# Patient Record
Sex: MALE | Race: BLACK OR AFRICAN AMERICAN | NOT HISPANIC OR LATINO | Employment: FULL TIME | URBAN - METROPOLITAN AREA
[De-identification: names, ages, dates, MRNs, and addresses within clinical notes are randomized per-mention and may not be internally consistent; named-entity substitution may affect disease eponyms.]

---

## 2017-10-17 ENCOUNTER — APPOINTMENT (OUTPATIENT)
Dept: CT IMAGING | Facility: CLINIC | Age: 32
End: 2017-10-17
Attending: EMERGENCY MEDICINE

## 2017-10-17 ENCOUNTER — HOSPITAL ENCOUNTER (EMERGENCY)
Facility: CLINIC | Age: 32
Discharge: HOME | End: 2017-10-17
Attending: EMERGENCY MEDICINE | Admitting: EMERGENCY MEDICINE
Payer: COMMERCIAL

## 2017-10-17 ENCOUNTER — APPOINTMENT (OUTPATIENT)
Dept: GENERAL RADIOLOGY | Facility: CLINIC | Age: 32
End: 2017-10-17
Attending: EMERGENCY MEDICINE

## 2017-10-17 VITALS
BODY MASS INDEX: 19.62 KG/M2 | HEART RATE: 62 BPM | RESPIRATION RATE: 14 BRPM | HEIGHT: 67 IN | OXYGEN SATURATION: 98 % | TEMPERATURE: 97.3 F | WEIGHT: 125 LBS | DIASTOLIC BLOOD PRESSURE: 66 MMHG | SYSTOLIC BLOOD PRESSURE: 108 MMHG
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
## 2017-10-17 DIAGNOSIS — M54.2 NECK PAIN: ICD-10-CM

## 2017-10-17 DIAGNOSIS — M54.6 ACUTE MIDLINE THORACIC BACK PAIN: ICD-10-CM

## 2017-10-17 LAB
RADIOLOGIST FLAGS: ABNORMAL
RADIOLOGIST FLAGS: ABNORMAL

## 2017-10-17 PROCEDURE — 72128 CT CHEST SPINE W/O DYE: CPT

## 2017-10-17 PROCEDURE — 25000132 ZZH RX MED GY IP 250 OP 250 PS 637: Performed by: EMERGENCY MEDICINE

## 2017-10-17 PROCEDURE — 99284 EMERGENCY DEPT VISIT MOD MDM: CPT | Mod: 25 | Performed by: EMERGENCY MEDICINE

## 2017-10-17 PROCEDURE — 72040 X-RAY EXAM NECK SPINE 2-3 VW: CPT

## 2017-10-17 PROCEDURE — 72125 CT NECK SPINE W/O DYE: CPT

## 2017-10-17 PROCEDURE — 71020 XR CHEST 2 VW: CPT

## 2017-10-17 PROCEDURE — 72072 X-RAY EXAM THORAC SPINE 3VWS: CPT

## 2017-10-17 PROCEDURE — 99284 EMERGENCY DEPT VISIT MOD MDM: CPT | Mod: Z6 | Performed by: EMERGENCY MEDICINE

## 2017-10-17 RX ORDER — IBUPROFEN 600 MG/1
600 TABLET, FILM COATED ORAL ONCE
Status: COMPLETED | OUTPATIENT
Start: 2017-10-17 | End: 2017-10-17

## 2017-10-17 RX ORDER — IBUPROFEN 600 MG/1
600 TABLET, FILM COATED ORAL EVERY 6 HOURS PRN
Qty: 30 TABLET | Refills: 0 | Status: SHIPPED | OUTPATIENT
Start: 2017-10-17 | End: 2020-01-17

## 2017-10-17 RX ORDER — ACETAMINOPHEN 325 MG/1
650 TABLET ORAL EVERY 4 HOURS PRN
Qty: 60 TABLET | Refills: 0 | Status: SHIPPED | OUTPATIENT
Start: 2017-10-17 | End: 2022-04-08

## 2017-10-17 RX ADMIN — IBUPROFEN 600 MG: 600 TABLET ORAL at 14:10

## 2017-10-17 ASSESSMENT — ENCOUNTER SYMPTOMS
VOMITING: 0
FEVER: 0
NUMBNESS: 0
NAUSEA: 0
COUGH: 0
ABDOMINAL PAIN: 0
WEAKNESS: 0
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
## 2017-10-17 NOTE — ED AVS SNAPSHOT
[facility], [place], Emergency Department    [address] [place] [address]    Phone:  [phone]                                       Lexy Cadena   MRN: [id number]    Department:  [facility], Emergency Department   Date of Visit:  10/17/2017           After Visit Summary Signature Page     I have received my discharge instructions, and my questions have been answered. I have discussed any challenges I see with this plan with the nurse or doctor.    ..........................................................................................................................................  Patient/Patient Representative Signature      ..........................................................................................................................................  Patient Representative Print Name and Relationship to Patient    ..................................................               ................................................  Date                                            Time    ..........................................................................................................................................  Reviewed by Signature/Title    ...................................................              ..............................................  Date                                                            Time
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
## 2017-10-17 NOTE — DISCHARGE INSTRUCTIONS

## 2018-05-14 ENCOUNTER — HOSPITAL ENCOUNTER (EMERGENCY)
Facility: CLINIC | Age: 33
Discharge: HOME | End: 2018-05-14
Attending: FAMILY MEDICINE | Admitting: EMERGENCY MEDICINE
Payer: COMMERCIAL

## 2018-05-14 VITALS
BODY MASS INDEX: 20.77 KG/M2 | WEIGHT: 132.6 LBS | SYSTOLIC BLOOD PRESSURE: 111 MMHG | HEART RATE: 54 BPM | RESPIRATION RATE: 16 BRPM | TEMPERATURE: 95.8 F | OXYGEN SATURATION: 100 % | DIASTOLIC BLOOD PRESSURE: 58 MMHG

## 2018-05-14 DIAGNOSIS — H60.331 ACUTE SWIMMER'S EAR OF RIGHT SIDE: ICD-10-CM

## 2018-05-14 PROCEDURE — 99284 EMERGENCY DEPT VISIT MOD MDM: CPT | Mod: Z6 | Performed by: EMERGENCY MEDICINE

## 2018-05-14 PROCEDURE — 99282 EMERGENCY DEPT VISIT SF MDM: CPT | Performed by: EMERGENCY MEDICINE

## 2018-05-14 RX ORDER — AMOXICILLIN 500 MG/1
500 CAPSULE ORAL 3 TIMES DAILY
Qty: 30 CAPSULE | Refills: 0 | Status: SHIPPED | OUTPATIENT
Start: 2018-05-14 | End: 2018-05-24

## 2018-05-14 RX ORDER — NEOMYCIN SULFATE, POLYMYXIN B SULFATE AND HYDROCORTISONE 10; 3.5; 1 MG/ML; MG/ML; [USP'U]/ML
4 SUSPENSION/ DROPS AURICULAR (OTIC) 4 TIMES DAILY
Qty: 10 ML | Refills: 0 | Status: SHIPPED | OUTPATIENT
Start: 2018-05-14 | End: 2022-04-08

## 2018-05-14 ASSESSMENT — ENCOUNTER SYMPTOMS
FEVER: 0
ABDOMINAL PAIN: 0
SHORTNESS OF BREATH: 0

## 2018-05-14 NOTE — DISCHARGE INSTRUCTIONS
See your doctor in 1 week.  We turned to the ED if you or having fever, severe headache, a lot of discharge from the right ear.  Take the antibiotics and apply the eardrops.  Take Motrin 600 mg every 6 hours as needed for pain.

## 2018-05-14 NOTE — ED AVS SNAPSHOT
[facility], [place], Emergency Department    [address]    [facility] [address]    Phone:  [phone]    Fax:  [phone]                                       Lexy Cadena   MRN: [id number]    Department:  [facility], Emergency Department   Date of Visit:  5/14/2018           After Visit Summary Signature Page     I have received my discharge instructions, and my questions have been answered. I have discussed any challenges I see with this plan with the nurse or doctor.    ..........................................................................................................................................  Patient/Patient Representative Signature      ..........................................................................................................................................  Patient Representative Print Name and Relationship to Patient    ..................................................               ................................................  Date                                            Time    ..........................................................................................................................................  Reviewed by Signature/Title    ...................................................              ..............................................  Date                                                            Time

## 2018-05-14 NOTE — ED AVS SNAPSHOT
[facility], Emergency Department    [address]    [place] [address]    Phone:  [phone]    Fax:  [phone]                                       Lexy Cadena   MRN: [id number]    Department:  [facility], Emergency Department   Date of Visit:  5/14/2018           Patient Information     Date Of Birth          1985        Your diagnoses for this visit were:     Acute swimmer's ear of right side        You were seen by Hayder Cordova MD and Bhavik Frances DO.      Follow-up Information     Follow up with [place], [facility] In 1 week.    Specialty:  Clinic    Contact information:    [address] [facility] [address]          Discharge Instructions       See your doctor in 1 week.  We turned to the ED if you or having fever, severe headache, a lot of discharge from the right ear.  Take the antibiotics and apply the eardrops.  Take Motrin 600 mg every 6 hours as needed for pain.    24 Hour Appointment Hotline       To make an appointment at any [place] clinic, call [phone] ([phone]). If you don't have a family doctor or clinic, we will help you find one. [place] clinics are conveniently located to serve the needs of you and your family.             Review of your medicines      START taking        Dose / Directions Last dose taken    amoxicillin 500 MG capsule   Commonly known as:  AMOXIL   Dose:  500 mg   Quantity:  30 capsule        Take 1 capsule (500 mg) by mouth 3 times daily for 10 days   Refills:  0        neomycin-polymyxin-hydrocortisone 3.5-25961-2 otic suspension   Commonly known as:  CORTISPORIN   Dose:  4 drop   Quantity:  10 mL        Place 4 drops into the right ear 4 times daily   Refills:  0          Our records show that you are taking the medicines listed below. If these are incorrect, please call your family doctor or clinic.        Dose / Directions Last dose taken    acetaminophen 325 MG tablet   Commonly known as:  TYLENOL    Dose:  650 mg   Quantity:  60 tablet        Take 2 tablets (650 mg) by mouth every 4 hours as needed for mild pain   Refills:  0        cyclobenzaprine 10 MG tablet   Commonly known as:  FLEXERIL   Dose:  10 mg   Quantity:  30 tablet        Take 1 tablet by mouth 3 times daily as needed for muscle spasms.   Refills:  1        ethambutol 400 MG tablet   Commonly known as:  MYAMBUTOL   Dose:  1200 mg   Quantity:  90 tablet        Take 3 tablets (1,200 mg) by mouth daily   Refills:  2        ibuprofen 600 MG tablet   Commonly known as:  ADVIL/MOTRIN   Dose:  600 mg   Quantity:  30 tablet        Take 1 tablet (600 mg) by mouth every 6 hours as needed for moderate pain   Refills:  0        loratadine 10 MG tablet   Commonly known as:  CLARITIN   Dose:  10 mg   Quantity:  30 tablet        Take 1 tablet (10 mg) by mouth daily   Refills:  12        meloxicam 15 MG tablet   Commonly known as:  MOBIC   Dose:  15 mg   Quantity:  30 tablet        Take 1 tablet (15 mg) by mouth daily   Refills:  1        methylPREDNISolone 4 MG tablet   Commonly known as:  MEDROL DOSEPAK   Quantity:  21 tablet        Follow package instructions   Refills:  0        rifampin 300 MG capsule   Commonly known as:  RIFADIN   Dose:  300 mg   Quantity:  30 capsule        Take 1 capsule (300 mg) by mouth daily   Refills:  2        tiotropium 2.5 MCG/ACT inhalation aerosol   Commonly known as:  SPIRIVA RESPIMAT   Dose:  2 puff   Quantity:  12 g        Inhale 2 puffs into the lungs daily   Refills:  3                Prescriptions were sent or printed at these locations (2 Prescriptions)                   Other Prescriptions                Printed at Department/Unit printer (2 of 2)         amoxicillin (AMOXIL) 500 MG capsule               neomycin-polymyxin-hydrocortisone (CORTISPORIN) 3.5-28415-3 otic suspension                Orders Needing Specimen Collection     None      Pending Results     No orders found from 5/12/2018 to 5/15/2018.           "  Pending Culture Results     No orders found from 2018 to 5/15/2018.            Pending Results Instructions     If you had any lab results that were not finalized at the time of your Discharge, you can call the ED Lab Result RN at [phone]. You will be contacted by this team for any positive Lab results or changes in treatment. The nurses are available 7 days a week from 10A to 6:30P.  You can leave a message 24 hours per day and they will return your call.        Thank you for choosing [facility]       Thank you for choosing [facility] for your care. Our goal is always to provide you with excellent care. Hearing back from our patients is one way we can continue to improve our services. Please take a few minutes to complete the written survey that you may receive in the mail after you visit with us. Thank you!        [employer] Information     [employer] lets you send messages to your doctor, view your test results, renew your prescriptions, schedule appointments and more. To sign up, go to [email or web address] . Click on \"Log in\" on the left side of the screen, which will take you to the Welcome page. Then click on \"Sign up Now\" on the right side of the page.     You will be asked to enter the access code listed below, as well as some personal information. Please follow the directions to create your username and password.     Your access code is: [email or web address]  Expires: 2018 11:56 AM     Your access code will  in 90 days. If you need help or a new code, please call your [facility] clinic or [phone].        Care EveryWhere ID     This is your Care EveryWhere ID. This could be used by other organizations to access your [facility] medical records  [id number]        Equal Access to Services     MARIANA ESCOBAR : Mike Valdez, maude hernandes, shital dorado. [facility] [phone].    ATENCIÓN: Si habla español, tiene a gupta disposición " servicios gratuitos de asistencia lingüística. Tyler ibarra [phone].    We comply with applicable federal civil rights laws and [place] laws. We do not discriminate on the basis of race, color, national origin, age, disability, sex, sexual orientation, or gender identity.            After Visit Summary       This is your record. Keep this with you and show to your community pharmacist(s) and doctor(s) at your next visit.

## 2018-05-14 NOTE — ED PROVIDER NOTES
History     Chief Complaint   Patient presents with     Otalgia     since yesterday at work with drainage     HPI  Lexy Cadena is a 33 year old male who presents for evaluation of right ear pain. The patient reports that yesterday he developed pain in the right ear that has persisted, prompting arrival. He states that he has also noticed some drainage from the right ear as well.  The patient reports a history of similar symptoms that occurred when he was young.  The patient states he is not done any swimming recently.  He denies fevers or chills.  No other concerns or complaints.  The patient reports that he is otherwise healthy.  He states he does not smoke or use alcohol.    No current facility-administered medications for this encounter.      Current Outpatient Prescriptions   Medication     amoxicillin (AMOXIL) 500 MG capsule     neomycin-polymyxin-hydrocortisone (CORTISPORIN) 3.5-72508-7 otic suspension     acetaminophen (TYLENOL) 325 MG tablet     cyclobenzaprine (FLEXERIL) 10 MG tablet     ethambutol (MYAMBUTOL) 400 MG tablet     ibuprofen (ADVIL/MOTRIN) 600 MG tablet     loratadine (CLARITIN) 10 MG tablet     meloxicam (MOBIC) 15 MG tablet     methylPREDNISolone (MEDROL DOSEPAK) 4 MG tablet     rifampin (RIFADIN) 300 MG capsule     tiotropium (SPIRIVA RESPIMAT) 2.5 MCG/ACT inhalation aerosol     Past Medical History:   Diagnosis Date     Arthritis        History reviewed. No pertinent surgical history.    No family history on file.    Social History   Substance Use Topics     Smoking status: Never Smoker     Smokeless tobacco: Never Used     Alcohol use No     No Known Allergies    I have reviewed the Medications, Allergies, Past Medical and Surgical History, and Social History in the Epic system.    Review of Systems   Constitutional: Negative for fever.   HENT: Positive for ear discharge (right) and ear pain (right).    Respiratory: Negative for shortness of breath.    Cardiovascular: Negative for  chest pain.   Gastrointestinal: Negative for abdominal pain.   All other systems reviewed and are negative.      Physical Exam   BP: 111/58  Pulse: 54  Temp: 95.8  F (35.4  C)  Resp: 16  Weight: 60.1 kg (132 lb 9.6 oz)  SpO2: 100 %      Physical Exam   Constitutional: No distress.   HENT:   Head: Atraumatic.   Right Ear: There is drainage (Present in the right auditory canal).   Mouth/Throat: Oropharynx is clear and moist. No oropharyngeal exudate.   Right TM cannot be visualized, discharge in the right ear canal.   Eyes: Pupils are equal, round, and reactive to light. No scleral icterus.   Cardiovascular: Normal heart sounds and intact distal pulses.    Pulmonary/Chest: Breath sounds normal. No respiratory distress.   Abdominal: Soft. Bowel sounds are normal. There is no tenderness.   Musculoskeletal: He exhibits no edema or tenderness.   Skin: Skin is warm. No rash noted. He is not diaphoretic.   Nursing note and vitals reviewed.        ED Course     ED Course     Procedures             Critical Care time:  none             Labs Ordered and Resulted from Time of ED Arrival Up to the Time of Departure from the ED - No data to display         Assessments & Plan (with Medical Decision Making) on physical exam patient is shown to have otitis externa.  As a result, treated for otitis externa as well by giving him amoxicillin and Corticosporin otic.  And he was told to take Motrin for pain and to follow-up with his doctor in 1 week.     This is a 33-year-old previously healthy male who presents with right-sided otalgia.  The patient is afebrile and hemodynamically stable.  On physical exam, he is noted to have discharge in the right auditory canal, and the right TM is not visualizable. The patient will be treated for otitis externa and otitis media. He was advised to use ibuprofen as needed for pain, and he was instructed to use this medication with food to prevent GI upset/ulcer. The patient is agreeable with the plan  and is discharged to home.       I have reviewed the nursing notes.    I have reviewed the findings, diagnosis, plan and need for follow up with the patient.    New Prescriptions    AMOXICILLIN (AMOXIL) 500 MG CAPSULE    Take 1 capsule (500 mg) by mouth 3 times daily for 10 days    NEOMYCIN-POLYMYXIN-HYDROCORTISONE (CORTISPORIN) 3.5-76897-8 OTIC SUSPENSION    Place 4 drops into the right ear 4 times daily       Final diagnoses:   Acute swimmer's ear of right side       5/14/2018   [facility] [place], EMERGENCY DEPARTMENT     [facility]  05/14/18 1157

## 2018-10-23 ENCOUNTER — TRANSFERRED RECORDS (OUTPATIENT)
Dept: HEALTH INFORMATION MANAGEMENT | Facility: CLINIC | Age: 33
End: 2018-10-23

## 2018-11-24 NOTE — TELEPHONE ENCOUNTER
FUTURE VISIT INFORMATION      FUTURE VISIT INFORMATION:    Date: 11/26/18    Time:     [place] [facility] [place]  REFERRAL INFORMATION:    Referring provider:  self    Referring providers clinic:      Reason for visit/diagnosis  Back pain, for a few years. Previous patient of   Dr. Peterson. Last visit 04/30/2015  No Surgeries. MRI at [facility] 1-2 years ago.  Appt per Patient.    RECORDS REQUESTED FROM:       Clinic name Comments Records Status Imaging Status     internal internal

## 2018-11-26 ENCOUNTER — OFFICE VISIT (OUTPATIENT)
Dept: ORTHOPEDICS | Facility: CLINIC | Age: 33
End: 2018-11-26
Payer: COMMERCIAL

## 2018-11-26 ENCOUNTER — PRE VISIT (OUTPATIENT)
Dept: ORTHOPEDICS | Facility: CLINIC | Age: 33
End: 2018-11-26

## 2018-11-26 VITALS — HEART RATE: 60 BPM | DIASTOLIC BLOOD PRESSURE: 70 MMHG | RESPIRATION RATE: 18 BRPM | SYSTOLIC BLOOD PRESSURE: 132 MMHG

## 2018-11-26 DIAGNOSIS — G89.29 CHRONIC BILATERAL LOW BACK PAIN WITHOUT SCIATICA: Primary | ICD-10-CM

## 2018-11-26 DIAGNOSIS — M54.50 CHRONIC BILATERAL LOW BACK PAIN WITHOUT SCIATICA: Primary | ICD-10-CM

## 2018-11-26 RX ORDER — ACETAMINOPHEN 500 MG
500-1000 TABLET ORAL EVERY 6 HOURS PRN
Qty: 100 TABLET | Refills: 1 | Status: SHIPPED | OUTPATIENT
Start: 2018-11-26 | End: 2022-04-08

## 2018-11-26 RX ORDER — NAPROXEN SODIUM 220 MG
220 TABLET ORAL 2 TIMES DAILY WITH MEALS
Qty: 60 TABLET | Refills: 0 | Status: SHIPPED | OUTPATIENT
Start: 2018-11-26 | End: 2022-04-08

## 2018-11-26 NOTE — PROGRESS NOTES
Subjective:   Lexy Cadena is a 33 year old male who presents with 16 years of chornic low back pain. Worse over the past 2 weeks. He was previously seen by Dr. Saeed but could not get an appt in f/u.  He had been diagnosed with DDD lumbar spine and facet arthritis aat L4-L5.   He has had improvement with PT home exercise and pain medication.  He has had pain that moves from the low back to his legs similar to when he saw Dr. Saeed.    He has been less active recently    Background:   Date of injury: NA   Duration of symptoms: 16 years; 2 weeks  Mechanism of Injury: Chronic; Unknown   Aggravating factors: walking,sitting  Relieving Factors: physical therapy exercises  Prior Evaluation: Prior Physician Evalutation:  and MRI: 9/15/16, PT    PAST MEDICAL, SOCIAL, SURGICAL AND FAMILY HISTORY: He  has a past medical history of Arthritis.  He  has no past surgical history on file.  His family history is not on file.  He reports that he has never smoked. He has never used smokeless tobacco. He reports that he does not drink alcohol or use illicit drugs.    ALLERGIES: He has No Known Allergies.    CURRENT MEDICATIONS: He has a current medication list which includes the following prescription(s): acetaminophen, cyclobenzaprine, ethambutol, ibuprofen, loratadine, meloxicam, methylprednisolone, neomycin-polymyxin-hydrocortisone, rifampin, and tiotropium.     REVIEW OF SYSTEMS: 3 point review of systems is negative except as noted above.  No fever, no weight loss,      Exam:   /70  Pulse 60  Resp 18     CONSTITUTIONAL: healthy, alert and no distress  HEAD: Normocephalic. No masses, lesions, tenderness or abnormalities  SKIN: no suspicious lesions or rashes  GAIT: normal  NEUROLOGIC: Non-focal  PSYCHIATRIC: flat affect and mentation appears normal.    MUSCULOSKELETAL:   Back diffusely tender lumbar spine  FF, ext and rotation all cause pain  Symmetric LE strength and reflexes  Neg slr    MR LUMBAR SPINE  W/O CONTRAST 9/15/2016 8:17 PM     History: Radiculopathy, lumbosacral region     Comparison: Lumbar spine MRI 7/31/2015, plain films 9/13/2016     Technique: Sagittal T1-weighted, sagittal T2-weighted, sagittal STIR,  sagittal diffusion-weighted, axial T2-weighted, and axial gradient  echo images of the lumbar spine were obtained without the  administration of intravenous contrast.     Findings:   There are 5 lumbar-type vertebrae. The tip of the conus medullaris is  at L2. Cauda equina nerve roots and visualized thoracic cord are  within normal limits.     Normal alignment of the lumbar vertebrae. Mild straightening of the  normal lumbar lordosis. Disc degeneration with loss of height and  intradiscal T2 signal at L4-L5 and L5-S1. Multilevel lumbar facet  hypertrophy.     Increased edema about the left L4-L5 facet joints, suggestive of  active facet arthropathy. No subchondral vertebral body marrow edema  to suggest an inflammatory spondyloarthropathy. No significant spinal  canal or neural foraminal stenosis.     Paraspinous soft tissues are unremarkable.         Impression:   1. Increased marrow edema about the left L4-L5 facet joint since  7/31/2015, suggestive of active facet arthropathy.   2. No abnormal vertebral body marrow edema to suggest an active  inflammatory spondyloarthropathy.  3. No significant spinal canal or neural foraminal stenosis.     RUBA HUBER MD     Assessment/Plan:   Acute on chronic low back pain for 16 years.  --previous MRI 2016 with Dr. Zarate showed mild DDD and lumbar facet arthropathy.   --normal examination and no red flag symptoms. I recommended he increase his activity including walking and start a PT program  --no activity restrictions  --start acetaminophen and naproxen  --he will f/u with Dr. Saeed to discuss his results in 6 weeks.    Deyvi Peterson MD CAQ

## 2018-11-26 NOTE — LETTER
11/26/2018      RE: Lexy Cadena  [address]  [facility] [place] [address]        Subjective:   Lexy Cadena is a 33 year old male who presents with 16 years of chornic low back pain. Worse over the past 2 weeks. He was previously seen by Dr. Saeed but could not get an appt in f/u.  He had been diagnosed with DDD lumbar spine and facet arthritis aat L4-L5.   He has had improvement with PT home exercise and pain medication.  He has had pain that moves from the low back to his legs similar to when he saw Dr. Saeed.    He has been less active recently    Background:   Date of injury: NA   Duration of symptoms: 16 years; 2 weeks  Mechanism of Injury: Chronic; Unknown   Aggravating factors: walking,sitting  Relieving Factors: physical therapy exercises  Prior Evaluation: Prior Physician Evalutation:  and MRI: 9/15/16, PT    PAST MEDICAL, SOCIAL, SURGICAL AND FAMILY HISTORY: He  has a past medical history of Arthritis.  He  has no past surgical history on file.  His family history is not on file.  He reports that he has never smoked. He has never used smokeless tobacco. He reports that he does not drink alcohol or use illicit drugs.    ALLERGIES: He has No Known Allergies.    CURRENT MEDICATIONS: He has a current medication list which includes the following prescription(s): acetaminophen, cyclobenzaprine, ethambutol, ibuprofen, loratadine, meloxicam, methylprednisolone, neomycin-polymyxin-hydrocortisone, rifampin, and tiotropium.     REVIEW OF SYSTEMS: 3 point review of systems is negative except as noted above.  No fever, no weight loss,      Exam:   /70  Pulse 60  Resp 18     CONSTITUTIONAL: healthy, alert and no distress  HEAD: Normocephalic. No masses, lesions, tenderness or abnormalities  SKIN: no suspicious lesions or rashes  GAIT: normal  NEUROLOGIC: Non-focal  PSYCHIATRIC: flat affect and mentation appears normal.    MUSCULOSKELETAL:   Back diffusely tender lumbar spine  FF, ext and rotation  all cause pain  Symmetric LE strength and reflexes  Neg slr    MR LUMBAR SPINE W/O CONTRAST 9/15/2016 8:17 PM     History: Radiculopathy, lumbosacral region     Comparison: Lumbar spine MRI 7/31/2015, plain films 9/13/2016     Technique: Sagittal T1-weighted, sagittal T2-weighted, sagittal STIR,  sagittal diffusion-weighted, axial T2-weighted, and axial gradient  echo images of the lumbar spine were obtained without the  administration of intravenous contrast.     Findings:   There are 5 lumbar-type vertebrae. The tip of the conus medullaris is  at L2. Cauda equina nerve roots and visualized thoracic cord are  within normal limits.     Normal alignment of the lumbar vertebrae. Mild straightening of the  normal lumbar lordosis. Disc degeneration with loss of height and  intradiscal T2 signal at L4-L5 and L5-S1. Multilevel lumbar facet  hypertrophy.     Increased edema about the left L4-L5 facet joints, suggestive of  active facet arthropathy. No subchondral vertebral body marrow edema  to suggest an inflammatory spondyloarthropathy. No significant spinal  canal or neural foraminal stenosis.     Paraspinous soft tissues are unremarkable.         Impression:   1. Increased marrow edema about the left L4-L5 facet joint since  7/31/2015, suggestive of active facet arthropathy.   2. No abnormal vertebral body marrow edema to suggest an active  inflammatory spondyloarthropathy.  3. No significant spinal canal or neural foraminal stenosis.     RUBA HUBER MD     Assessment/Plan:   Acute on chronic low back pain for 16 years.  --previous MRI 2016 with Dr. Zarate showed mild DDD and lumbar facet arthropathy.   --normal examination and no red flag symptoms. I recommended he increase his activity including walking and start a PT program  --no activity restrictions  --start acetaminophen and naproxen  --he will f/u with Dr. Saeed to discuss his results in 6 weeks.    Deyvi Peterson MD CAQ      Deyvi Peterson,  MD

## 2018-11-26 NOTE — MR AVS SNAPSHOT
After Visit Summary   2018    Lexy Cadena    MRN: [id number]           Patient Information     Date Of Birth          1985        Visit Information        Provider Department      2018 2:15 PM Deyvi Peterson MD; LANGUAGE [facility]        Today's Diagnoses     Chronic bilateral low back pain without sciatica    -  1       Follow-ups after your visit        Additional Services     PHYSICAL THERAPY REFERRAL (Internal)       Physical Therapy Referral                  Your next 10 appointments already scheduled     Dec 04, 2018 12:20 PM CST   (Arrive by 12:05 PM)   [facility] Spine with Joycelyn Ortiz PT   [facility] Physical Therapy [facility] ([facility])    [address] [place] [address] [facility] [address] [phone]              Future tests that were ordered for you today     Open Future Orders        Priority Expected Expires Ordered    PHYSICAL THERAPY REFERRAL (Internal) Routine  2019            Who to contact     Please call your clinic at [phone] to:    Ask questions about your health    Make or cancel appointments    Discuss your medicines    Learn about your test results    Speak to your doctor            Additional Information About Your Visit        MyChart Information     [email or web address] is an electronic gateway that provides easy, online access to your medical records. With [email or web address], you can request a clinic appointment, read your test results, renew a prescription or communicate with your care team.     To sign up for Seert visit the website at [email or web address]   You will be asked to enter the access code listed below, as well as some personal information. Please follow the directions to create your username and password.     Your access code is: [email or web address]  Expires: 2019  6:30 AM     Your access code will  in 90 days. If you need help or a new code, please contact your  [facility] or call [phone] for assistance.        Care EveryWhere ID     This is your Care EveryWhere ID. This could be used by other organizations to access your [place] medical records  [id number]        Your Vitals Were     Pulse Respirations                60 18           Blood Pressure from Last 3 Encounters:   11/26/18 132/70   05/14/18 111/58   10/17/17 108/66    Weight from Last 3 Encounters:   05/14/18 60.1 kg (132 lb 9.6 oz)   10/17/17 56.7 kg (125 lb)   09/13/16 57.6 kg (127 lb)                 Today's Medication Changes          These changes are accurate as of 11/26/18 11:59 PM.  If you have any questions, ask your nurse or doctor.               Start taking these medicines.        Dose/Directions    naproxen sodium 220 MG tablet   Commonly known as:  ANAPROX   Used for:  Chronic bilateral low back pain without sciatica   Started by:  Deyvi Peterson MD        Dose:  220 mg   Take 1 tablet (220 mg) by mouth 2 times daily (with meals)   Quantity:  60 tablet   Refills:  0         These medicines have changed or have updated prescriptions.        Dose/Directions    * acetaminophen 325 MG tablet   Commonly known as:  TYLENOL   This may have changed:  Another medication with the same name was added. Make sure you understand how and when to take each.   Changed by:  Deyvi Peterson MD        Dose:  650 mg   Take 2 tablets (650 mg) by mouth every 4 hours as needed for mild pain   Quantity:  60 tablet   Refills:  0       * acetaminophen 500 MG tablet   Commonly known as:  ACETAMINOPHEN EXTRA STRENGTH   This may have changed:  You were already taking a medication with the same name, and this prescription was added. Make sure you understand how and when to take each.   Used for:  Chronic bilateral low back pain without sciatica   Changed by:  Deyvi Peterson MD        Dose:  500-1000 mg   Take 1-2 tablets (500-1,000 mg) by mouth every 6 hours as  needed for mild pain   Quantity:  100 tablet   Refills:  1       * Notice:  This list has 2 medication(s) that are the same as other medications prescribed for you. Read the directions carefully, and ask your doctor or other care provider to review them with you.         Where to get your medicines      These medications were sent to [facility] - [place], [place] - [facility] [address]  [address] [facility] [address]     Phone:  [phone]     acetaminophen 500 MG tablet    naproxen sodium 220 MG tablet                Primary Care Provider Fax #    [facility] [phone]       [address] [place] [address] [address]  [facility] [address]        Equal Access to Services     CASS [facility] Center[facility]MUNDO : Hadii lindsey peralta hadasho Somanjinder, waaxda luqadaha, qaybta kaalmada ademissael, shital thurman . So [facility] [phone].    ATENCIÓN: Si habla español, tiene a gupta disposición servicios gratuitos de asistencia lingüística. Llame al [phone].    We comply with applicable federal civil rights laws and [place] laws. We do not discriminate on the basis of race, color, national origin, age, disability, sex, sexual orientation, or gender identity.            Thank you!     Thank you for choosing [facility]  for your care. Our goal is always to provide you with excellent care. Hearing back from our patients is one way we can continue to improve our services. Please take a few minutes to complete the written survey that you may receive in the mail after your visit with us. Thank you!             Your Updated Medication List - Protect others around you: Learn how to safely use, store and throw away your medicines at www.disposemymeds.org.          This list is accurate as of 11/26/18 11:59 PM.  Always use your most recent med list.                   Brand Name Dispense Instructions for use Diagnosis    * acetaminophen 325 MG tablet    TYLENOL    60 tablet    Take 2 tablets (650 mg) by mouth  every 4 hours as needed for mild pain        * acetaminophen 500 MG tablet    ACETAMINOPHEN EXTRA STRENGTH    100 tablet    Take 1-2 tablets (500-1,000 mg) by mouth every 6 hours as needed for mild pain    Chronic bilateral low back pain without sciatica       cyclobenzaprine 10 MG tablet    FLEXERIL    30 tablet    Take 1 tablet by mouth 3 times daily as needed for muscle spasms.    Mid back pain       ethambutol 400 MG tablet    MYAMBUTOL    90 tablet    Take 3 tablets (1,200 mg) by mouth daily    Mycobacterium avium complex (H)       ibuprofen 600 MG tablet    ADVIL/MOTRIN    30 tablet    Take 1 tablet (600 mg) by mouth every 6 hours as needed for moderate pain        loratadine 10 MG tablet    CLARITIN    30 tablet    Take 1 tablet (10 mg) by mouth daily    Cough, Shortness of breath       meloxicam 15 MG tablet    MOBIC    30 tablet    Take 1 tablet (15 mg) by mouth daily    Radicular pain of lumbosacral region       methylPREDNISolone 4 MG tablet    MEDROL DOSEPAK    21 tablet    Follow package instructions    Radicular pain of lumbosacral region       naproxen sodium 220 MG tablet    ANAPROX    60 tablet    Take 1 tablet (220 mg) by mouth 2 times daily (with meals)    Chronic bilateral low back pain without sciatica       neomycin-polymyxin-hydrocortisone 3.5-39652-1 otic suspension    CORTISPORIN    10 mL    Place 4 drops into the right ear 4 times daily        rifampin 300 MG capsule    RIFADIN    30 capsule    Take 1 capsule (300 mg) by mouth daily    Mycobacterium avium complex (H)       tiotropium 2.5 MCG/ACT inhaler    SPIRIVA RESPIMAT    12 g    Inhale 2 puffs into the lungs daily    SOB (shortness of breath)       * Notice:  This list has 2 medication(s) that are the same as other medications prescribed for you. Read the directions carefully, and ask your doctor or other care provider to review them with you.

## 2018-11-26 NOTE — LETTER
11/26/2018      RE: Lexy Caedna  [address]  [facility] [address]        Subjective:   Lexy Cadena is a 33 year old male who presents with 16 years of chornic low back pain. Worse over the past 2 weeks. He was previously seen by Dr. Saeed but could not get an appt in f/u.  He had been diagnosed with DDD lumbar spine and facet arthritis aat L4-L5.   He has had improvement with PT home exercise and pain medication.  He has had pain that moves from the low back to his legs similar to when he saw Dr. Saeed.    He has been less active recently    Background:   Date of injury: NA   Duration of symptoms: 16 years; 2 weeks  Mechanism of Injury: Chronic; Unknown   Aggravating factors: walking,sitting  Relieving Factors: physical therapy exercises  Prior Evaluation: Prior Physician Evalutation:  and MRI: 9/15/16, PT    PAST MEDICAL, SOCIAL, SURGICAL AND FAMILY HISTORY: He  has a past medical history of Arthritis.  He  has no past surgical history on file.  His family history is not on file.  He reports that he has never smoked. He has never used smokeless tobacco. He reports that he does not drink alcohol or use illicit drugs.    ALLERGIES: He has No Known Allergies.    CURRENT MEDICATIONS: He has a current medication list which includes the following prescription(s): acetaminophen, cyclobenzaprine, ethambutol, ibuprofen, loratadine, meloxicam, methylprednisolone, neomycin-polymyxin-hydrocortisone, rifampin, and tiotropium.     REVIEW OF SYSTEMS: 3 point review of systems is negative except as noted above.  No fever, no weight loss,      Exam:   /70  Pulse 60  Resp 18     CONSTITUTIONAL: healthy, alert and no distress  HEAD: Normocephalic. No masses, lesions, tenderness or abnormalities  SKIN: no suspicious lesions or rashes  GAIT: normal  NEUROLOGIC: Non-focal  PSYCHIATRIC: flat affect and mentation appears normal.    MUSCULOSKELETAL:   Back diffusely tender lumbar spine  FF, ext and rotation  all cause pain  Symmetric LE strength and reflexes  Neg slr    MR LUMBAR SPINE W/O CONTRAST 9/15/2016 8:17 PM     History: Radiculopathy, lumbosacral region     Comparison: Lumbar spine MRI 7/31/2015, plain films 9/13/2016     Technique: Sagittal T1-weighted, sagittal T2-weighted, sagittal STIR,  sagittal diffusion-weighted, axial T2-weighted, and axial gradient  echo images of the lumbar spine were obtained without the  administration of intravenous contrast.     Findings:   There are 5 lumbar-type vertebrae. The tip of the conus medullaris is  at L2. Cauda equina nerve roots and visualized thoracic cord are  within normal limits.     Normal alignment of the lumbar vertebrae. Mild straightening of the  normal lumbar lordosis. Disc degeneration with loss of height and  intradiscal T2 signal at L4-L5 and L5-S1. Multilevel lumbar facet  hypertrophy.     Increased edema about the left L4-L5 facet joints, suggestive of  active facet arthropathy. No subchondral vertebral body marrow edema  to suggest an inflammatory spondyloarthropathy. No significant spinal  canal or neural foraminal stenosis.     Paraspinous soft tissues are unremarkable.         Impression:   1. Increased marrow edema about the left L4-L5 facet joint since  7/31/2015, suggestive of active facet arthropathy.   2. No abnormal vertebral body marrow edema to suggest an active  inflammatory spondyloarthropathy.  3. No significant spinal canal or neural foraminal stenosis.     RUBA HUBER MD     Assessment/Plan:   Acute on chronic low back pain for 16 years.  --previous MRI 2016 with Dr. Zarate showed mild DDD and lumbar facet arthropathy.   --normal examination and no red flag symptoms. I recommended he increase his activity including walking and start a PT program  --no activity restrictions  --start acetaminophen and naproxen  --he will f/u with Dr. Saeed to discuss his results in 6 weeks.    Deyvi Peterson MD CAQ      Deyvi Peterson,  MD

## 2018-12-04 ENCOUNTER — THERAPY VISIT (OUTPATIENT)
Dept: PHYSICAL THERAPY | Facility: CLINIC | Age: 33
End: 2018-12-04
Attending: FAMILY MEDICINE
Payer: COMMERCIAL

## 2018-12-04 DIAGNOSIS — M54.50 CHRONIC BILATERAL LOW BACK PAIN WITHOUT SCIATICA: ICD-10-CM

## 2018-12-04 DIAGNOSIS — G89.29 CHRONIC BILATERAL LOW BACK PAIN WITHOUT SCIATICA: ICD-10-CM

## 2018-12-04 PROCEDURE — 97112 NEUROMUSCULAR REEDUCATION: CPT | Mod: GP | Performed by: PHYSICAL THERAPIST

## 2018-12-04 PROCEDURE — 97161 PT EVAL LOW COMPLEX 20 MIN: CPT | Mod: GP | Performed by: PHYSICAL THERAPIST

## 2018-12-04 NOTE — MR AVS SNAPSHOT
After Visit Summary   12/4/2018    Lexy Cadena    MRN: [id number]           Patient Information     Date Of Birth          1985        Visit Information        Provider Department      12/4/2018 12:05 PM Joycelyn Ortiz PT; LANGUAGE [facility]        Today's Diagnoses     Chronic bilateral low back pain without sciatica           Follow-ups after your visit        Your next 10 appointments already scheduled     Dec 12, 2018 11:00 AM CST   TEL[facility] Spine with Danika Chacko PTA    [facility] Physical [facility] ([facility])    [address] [place] [address] [phone]            Dec 19, 2018 12:50 PM CST   T[facility] Spine with Joycelyn Ortiz PT    [facility] ([facility])    [address] [place] [address] [phone]            Dec 26, 2018 12:50 PM CST   [facility] Spine with Joycelyn Ortiz PT    [facility] ([facility])    [address] [place] [address] [phone]              Who to contact     If you have questions or need follow up information about today's clinic visit or your schedule please contact [facility] PHYSICAL THERAPY [facility] directly at [phone].  Normal or non-critical lab and imaging results will be communicated to you by MyChart, letter or phone within 4 business days after the clinic has received the results. If you do not hear from us within 7 days, please contact the clinic through MyChart or phone. If you have a critical or abnormal lab result, we will notify you by phone as soon as possible.  Submit refill requests through Ziffi or call your pharmacy and they will forward the refill request to us. Please allow 3 business days for your refill to be completed.          Additional Information About Your Visit        MyChart Information      "[facility] lets you send messages to your doctor, view your test results, renew your prescriptions, schedule appointments and more. To sign up, go to [email or web address] . Click on \"Log in\" on the left side of the screen, which will take you to the Welcome page. Then click on \"Sign up Now\" on the right side of the page.     You will be asked to enter the access code listed below, as well as some personal information. Please follow the directions to create your username and password.     Your access code is: [email or web address]  Expires: 2019  6:30 AM     Your access code will  in 90 days. If you need help or a new code, please call your [facility] clinic or [phone].        Care EveryWhere ID     This is your Care EveryWhere ID. This could be used by other organizations to access your [facility] medical records  [id number]         Blood Pressure from Last 3 Encounters:   18 132/70   18 111/58   10/17/17 108/66    Weight from Last 3 Encounters:   18 60.1 kg (132 lb 9.6 oz)   10/17/17 56.7 kg (125 lb)   16 57.6 kg (127 lb)              We Performed the Following     HC PT EVAL, LOW COMPLEXITY     TELMA INITIAL EVAL REPORT     NEUROMUSCULAR RE-EDUCATION     PHYSICAL THERAPY REFERRAL (Internal)        Primary Care Provider Fax #    [facility] [phone]       [facility] [address]  [facility] [address]        Equal Access to Services     MARIANA ESCOBAR : Hadii aad ku hadasho Soomaali, waaxda luqadaha, qaybta kaalmada adeegyada, waxay jossy thurman . So [facility] [phone].    ATENCIÓN: Si habla español, tiene a gupta disposición servicios gratuitos de asistencia lingüística. Llame al [phone].    We comply with applicable federal civil rights laws and [place] laws. We do not discriminate on the basis of race, color, national origin, age, disability, sex, sexual orientation, or gender identity.            Thank you!     Thank you for choosing [facility] " PHYSICAL THERAPY [facility]  for your care. Our goal is always to provide you with excellent care. Hearing back from our patients is one way we can continue to improve our services. Please take a few minutes to complete the written survey that you may receive in the mail after your visit with us. Thank you!             Your Updated Medication List - Protect others around you: Learn how to safely use, store and throw away your medicines at www.disposemymeds.org.          This list is accurate as of 12/4/18  1:29 PM.  Always use your most recent med list.                   Brand Name Dispense Instructions for use Diagnosis    * acetaminophen 325 MG tablet    TYLENOL    60 tablet    Take 2 tablets (650 mg) by mouth every 4 hours as needed for mild pain        * acetaminophen 500 MG tablet    ACETAMINOPHEN EXTRA STRENGTH    100 tablet    Take 1-2 tablets (500-1,000 mg) by mouth every 6 hours as needed for mild pain    Chronic bilateral low back pain without sciatica       cyclobenzaprine 10 MG tablet    FLEXERIL    30 tablet    Take 1 tablet by mouth 3 times daily as needed for muscle spasms.    Mid back pain       ethambutol 400 MG tablet    MYAMBUTOL    90 tablet    Take 3 tablets (1,200 mg) by mouth daily    Mycobacterium avium complex (H)       ibuprofen 600 MG tablet    ADVIL/MOTRIN    30 tablet    Take 1 tablet (600 mg) by mouth every 6 hours as needed for moderate pain        loratadine 10 MG tablet    CLARITIN    30 tablet    Take 1 tablet (10 mg) by mouth daily    Cough, Shortness of breath       meloxicam 15 MG tablet    MOBIC    30 tablet    Take 1 tablet (15 mg) by mouth daily    Radicular pain of lumbosacral region       methylPREDNISolone 4 MG tablet therapy pack    MEDROL DOSEPAK    21 tablet    Follow package instructions    Radicular pain of lumbosacral region       naproxen sodium 220 MG tablet    ANAPROX    60 tablet    Take 1 tablet (220 mg) by mouth 2 times daily (with meals)    Chronic bilateral low back  pain without sciatica       neomycin-polymyxin-hydrocortisone 3.5-72717-7 otic suspension    CORTISPORIN    10 mL    Place 4 drops into the right ear 4 times daily        rifampin 300 MG capsule    RIFADIN    30 capsule    Take 1 capsule (300 mg) by mouth daily    Mycobacterium avium complex (H)       tiotropium 2.5 MCG/ACT inhaler    SPIRIVA RESPIMAT    12 g    Inhale 2 puffs into the lungs daily    SOB (shortness of breath)       * Notice:  This list has 2 medication(s) that are the same as other medications prescribed for you. Read the directions carefully, and ask your doctor or other care provider to review them with you.

## 2018-12-04 NOTE — PROGRESS NOTES
[facility] Initial Evaluation  Subjective:  [facility]                  [facility]  Physical Therapy Initial Examination/Evaluation  December 4, 2018    Lexy Cadena is a 33 year old  male referred to physical therapy by Dr. Peterson for treatment of low back pain with Precautions/Restrictions/MD instructions none    KEY PT FINDINGS:  1.  Poor core stability - significant trouble activating lower abdominals  2.  Diffuse decrease in sensation R LE  3.  Weakness of L2-L4 myotomes on R    Subjective:  Referring MD visit date: 11/26/18  DOI/onset: 5 years ago  Mechanism of injury: Insidious onset of low back pain  DOS none  Previous treatment: PT - sometimes helpful  Imaging: MRI  Chief Complaint:   Pain in low back and R leg; Pain is worse with standing, sitting   Pain: best 6 /10, worst 7/10 B lumbar, R leg; numbness Described as: sharp, aching Alleviated by: changing position Frequency: constant Progression of symptoms since initial onset: worsening Time of day when pain is worse: not related  Sleeping: sometimes wakes with pain  Social history:  Immigrant from [place]    Occupation: not working currently  Job duties:  none    Current HEP/exercise regimen: none  Patient's goals are reduce pain     Pertinent PMH: None   General Health Reported by Patient: Good  Return to MD:  PRN       Objective:    Gait:    Gait Type:  Normal   Assistive Devices:  None                 Lumbar/SI Evaluation  ROM:    AROM Lumbar:   Flexion:            75% +  Ext:                    50% ++   Side Bend:        Left:  75% +    Right:  75% +  Rotation:           Left:  75% +    Right:  75% +  Side Glide:        Left:     Right:         Strength: Significant weakness of lower abdominals.  Glute med 3+/5 on R, 4-/5 on L  Lumbar Myotomes:    T12-L3 (Hip Flex):  Left: 5    Right: 4  L2-4 (Quads):  Left:  5    Right:  4  L4 (Ankle DF):  Left:  5    Right:  4  L5 (Great Toe  Ext): Left: 5    Right: 5   S1 (Toe Raise):  Left: 5    Right: 5      Lumbar Dermtomes:      L1 Right:  Hypo-light touch  L2 Right:  Hypo-light touch  L3 Right:  Hypo-light touch  L4 Right:  Hypo-light touch  L5 Right:  Hypo-light touch  S1 Right:  Hypo-light touch  Neural Tension/Mobility:      Right side:   SLR positive.  Lumbar Palpation:    Tenderness present at Left:    Quadratus Lumborum and Erector Spinae  Tenderness present at Right: Quadratus Lumborum and Erector Spinae    Lumbar Provocation:      Left negative with:  PROM hip  Right positive with: PROM hip  Spinal Segmental Conclusions:     Level: Hypo noted at L1, L2, L3, L4 and L5                                                   General     ROS    Assessment/Plan:    Patient is a 33 year old male with lumbar complaints.    Patient has the following significant findings with corresponding treatment plan.                Diagnosis 1:  Low back pain with radiculopathy    Pain -  hot/cold therapy, US, electric stimulation, mechanical traction, manual therapy, splint/taping/bracing/orthotics, self management, education, directional preference exercise and home program  Decreased ROM/flexibility - manual therapy and therapeutic exercise  Decreased joint mobility - manual therapy and therapeutic exercise  Decreased strength - therapeutic exercise and therapeutic activities  Impaired balance - neuro re-education and therapeutic activities  Decreased proprioception - neuro re-education and therapeutic activities  Impaired muscle performance - neuro re-education  Decreased function - therapeutic activities    Therapy Evaluation Codes:   1) History comprised of:   Personal factors that impact the plan of care:      Language, Social history/culture and Time since onset of symptoms.    Comorbidity factors that impact the plan of care are:      None.     Medications impacting care: None.  2) Examination of Body Systems comprised of:   Body structures and functions  that impact the plan of care:      Lumbar spine.   Activity limitations that impact the plan of care are:      Sitting and Standing.  3) Clinical presentation characteristics are:   Stable/Uncomplicated.  4) Decision-Making    Low complexity using standardized patient assessment instrument and/or measureable assessment of functional outcome.  Cumulative Therapy Evaluation is: Low complexity.    Previous and current functional limitations:  (See Goal Flow Sheet for this information)    Short term and Long term goals: (See Goal Flow Sheet for this information)     Communication ability:  Patient has an  for communication clarity.  Treatment Explanation - The following has been discussed with the patient:   RX ordered/plan of care  Anticipated outcomes  Possible risks and side effects  This patient would benefit from PT intervention to resume normal activities.   Rehab potential is good.    Frequency:  1 X week, once daily  Duration:  for 8 weeks  Discharge Plan:  Achieve all LTG.  Independent in home treatment program.  Reach maximal therapeutic benefit.    Please refer to the daily flowsheet for treatment today, total treatment time and time spent performing 1:1 timed codes.

## 2018-12-12 ENCOUNTER — TELEPHONE (OUTPATIENT)
Dept: ORTHOPEDICS | Facility: CLINIC | Age: 33
End: 2018-12-12

## 2018-12-12 ENCOUNTER — THERAPY VISIT (OUTPATIENT)
Dept: PHYSICAL THERAPY | Facility: CLINIC | Age: 33
End: 2018-12-12
Payer: COMMERCIAL

## 2018-12-12 DIAGNOSIS — M54.50 LOW BACK PAIN RADIATING TO RIGHT LEG: ICD-10-CM

## 2018-12-12 DIAGNOSIS — M54.9 BACK PAIN: Primary | ICD-10-CM

## 2018-12-12 DIAGNOSIS — M79.604 LOW BACK PAIN RADIATING TO RIGHT LEG: ICD-10-CM

## 2018-12-12 PROCEDURE — 97112 NEUROMUSCULAR REEDUCATION: CPT | Mod: GP | Performed by: PHYSICAL THERAPY ASSISTANT

## 2018-12-12 PROCEDURE — 97110 THERAPEUTIC EXERCISES: CPT | Mod: GP | Performed by: PHYSICAL THERAPY ASSISTANT

## 2018-12-12 NOTE — TELEPHONE ENCOUNTER
Health Call Center    Phone Message    May a detailed message be left on voicemail: yes    Reason for Call: Dr Robert Sidhu Chief Medical Consultant from Disability services [facility] calling to give additional information for Dr Peterson on pt's back. Please call him back. He needs to chat with him ASAP.    Action Taken: Message routed to:  [facility] ([facility] Sports Medicine

## 2018-12-14 NOTE — TELEPHONE ENCOUNTER
Left VM that Dr. Peterson would like a LYNNE signed by the patient before discussing and patient information.

## 2018-12-19 ENCOUNTER — THERAPY VISIT (OUTPATIENT)
Dept: PHYSICAL THERAPY | Facility: CLINIC | Age: 33
End: 2018-12-19
Payer: COMMERCIAL

## 2018-12-19 DIAGNOSIS — M54.50 BILATERAL LOW BACK PAIN WITHOUT SCIATICA: Primary | ICD-10-CM

## 2018-12-19 PROCEDURE — 97112 NEUROMUSCULAR REEDUCATION: CPT | Mod: GP | Performed by: PHYSICAL THERAPIST

## 2018-12-19 PROCEDURE — 97110 THERAPEUTIC EXERCISES: CPT | Mod: GP | Performed by: PHYSICAL THERAPIST

## 2018-12-26 ENCOUNTER — THERAPY VISIT (OUTPATIENT)
Dept: PHYSICAL THERAPY | Facility: CLINIC | Age: 33
End: 2018-12-26
Payer: COMMERCIAL

## 2018-12-26 DIAGNOSIS — M54.41 BILATERAL LOW BACK PAIN WITH RIGHT-SIDED SCIATICA: Primary | ICD-10-CM

## 2018-12-26 PROCEDURE — 97110 THERAPEUTIC EXERCISES: CPT | Mod: GP | Performed by: PHYSICAL THERAPIST

## 2018-12-26 PROCEDURE — 97140 MANUAL THERAPY 1/> REGIONS: CPT | Mod: GP | Performed by: PHYSICAL THERAPIST

## 2018-12-26 NOTE — PROGRESS NOTES
Subjective:  HPI                    Objective:  System    Physical Exam    General     ROS    Assessment/Plan:    PROGRESS  REPORT    Progress reporting period is from 18 to 18.       SUBJECTIVE  Subjective: Patient states that he has had a flare up of pain over the past two days.  Unable to do his HEP d/t pain.  Feeling pain into his anterior thigh on the R.      Changes in function:  Yes (See Goal flowsheet attached for changes in current functional level)  Adverse reaction to treatment or activity: None    OBJECTIVE  Changes noted in objective findings:  Yes  Objective: ROM lumbar spine: flexion 75% +, extension 50% +, SBing 50% +, Rotation 50%.  Myotomal testin/5 hip flexion on R.  Negative SLR bilaterally.     ASSESSMENT/PLAN  Updated problem list and treatment plan: Diagnosis 1:  LBP    \Pain -  hot/cold therapy, US, electric stimulation, manual therapy, splint/taping/bracing/orthotics, self management, education and home program  Decreased ROM/flexibility - manual therapy and therapeutic exercise  Decreased joint mobility - manual therapy and therapeutic exercise  Decreased strength - therapeutic exercise and therapeutic activities  Impaired balance - neuro re-education and therapeutic activities  Decreased proprioception - neuro re-education and therapeutic activities  Impaired muscle performance - neuro re-education  Decreased function - therapeutic activities  STG/LTGs have been met or progress has been made towards goals:  Yes (See Goal flow sheet completed today.)  Assessment of Progress: The patient's condition is improving.  Self Management Plans:  Patient has been instructed in a home treatment program.  I have re-evaluated this patient and find that the nature, scope, duration and intensity of the therapy is appropriate for the medical condition of the patient.  Lexy continues to require the following intervention to meet STG and LTG's:  PT    Recommendations:  This patient would  benefit from continued therapy.     Frequency:  1 X week, once daily  Duration:  for 4 weeks      This patient would benefit from further evaluation.    Please refer to the daily flowsheet for treatment today, total treatment time and time spent performing 1:1 timed codes.

## 2019-02-01 ENCOUNTER — OFFICE VISIT (OUTPATIENT)
Dept: ORTHOPEDICS | Facility: CLINIC | Age: 34
End: 2019-02-01
Payer: COMMERCIAL

## 2019-02-01 VITALS — WEIGHT: 132 LBS | BODY MASS INDEX: 20.67 KG/M2

## 2019-02-01 DIAGNOSIS — M54.16 LUMBAR RADICULAR PAIN: ICD-10-CM

## 2019-02-01 DIAGNOSIS — M62.830 BACK MUSCLE SPASM: Primary | ICD-10-CM

## 2019-02-01 RX ORDER — GABAPENTIN 300 MG/1
300 CAPSULE ORAL 3 TIMES DAILY
Qty: 90 CAPSULE | Refills: 3 | Status: SHIPPED | OUTPATIENT
Start: 2019-02-01 | End: 2020-02-01

## 2019-02-01 RX ORDER — METHYLPREDNISOLONE 4 MG
TABLET ORAL
Qty: 1 PACKAGE | Refills: 0 | Status: SHIPPED | OUTPATIENT
Start: 2019-02-01 | End: 2022-04-08

## 2019-02-01 RX ORDER — METHYLPREDNISOLONE 4 MG
TABLET, DOSE PACK ORAL
Qty: 21 TABLET | Refills: 0 | Status: SHIPPED | OUTPATIENT
Start: 2019-02-01 | End: 2022-04-08

## 2019-02-01 NOTE — PROGRESS NOTES
HISTORY OF PRESENT ILLNESS  Mr. Cadena is a pleasant 34 year old year old male who presents to clinic today with low back pain that radiates into his legs  Lexy explains that he has had this pain for over a year  Has done some PT for it and not got better  Location: low back  Quality:  achy pain    Duration: see above  Timing: occurs intermittently  Context: occurs while standing and walking and sitting too long  Modifying factors:  resting and non-use makes it better, movement and use makes it worse  Associated signs & symptoms: radiation into legs    Additional history: as documented    MEDICAL HISTORY  Patient Active Problem List   Diagnosis     TB (tuberculosis)     Back pain     Low back pain radiating to right leg     CARDIOVASCULAR SCREENING; LDL GOAL LESS THAN 160     Low back pain     Back muscle spasm     Facet arthropathy, lumbosacral       Current Outpatient Medications   Medication Sig Dispense Refill     acetaminophen (ACETAMINOPHEN EXTRA STRENGTH) 500 MG tablet Take 1-2 tablets (500-1,000 mg) by mouth every 6 hours as needed for mild pain 100 tablet 1     acetaminophen (TYLENOL) 325 MG tablet Take 2 tablets (650 mg) by mouth every 4 hours as needed for mild pain 60 tablet 0     cyclobenzaprine (FLEXERIL) 10 MG tablet Take 1 tablet by mouth 3 times daily as needed for muscle spasms. 30 tablet 1     ethambutol (MYAMBUTOL) 400 MG tablet Take 3 tablets (1,200 mg) by mouth daily 90 tablet 2     ibuprofen (ADVIL/MOTRIN) 600 MG tablet Take 1 tablet (600 mg) by mouth every 6 hours as needed for moderate pain 30 tablet 0     loratadine (CLARITIN) 10 MG tablet Take 1 tablet (10 mg) by mouth daily 30 tablet 12     meloxicam (MOBIC) 15 MG tablet Take 1 tablet (15 mg) by mouth daily 30 tablet 1     methylPREDNISolone (MEDROL DOSEPAK) 4 MG tablet Follow package instructions 21 tablet 0     naproxen sodium (ANAPROX) 220 MG tablet Take 1 tablet (220 mg) by mouth 2 times daily (with meals) 60 tablet 0      neomycin-polymyxin-hydrocortisone (CORTISPORIN) 3.5-66985-1 otic suspension Place 4 drops into the right ear 4 times daily 10 mL 0     rifampin (RIFADIN) 300 MG capsule Take 1 capsule (300 mg) by mouth daily 30 capsule 2     tiotropium (SPIRIVA RESPIMAT) 2.5 MCG/ACT inhalation aerosol Inhale 2 puffs into the lungs daily 12 g 3       No Known Allergies    No family history on file.    Additional medical/Social/Surgical histories reviewed in [facility] and updated as appropriate.     REVIEW OF SYSTEMS (2/1/2019)  10 point ROS of systems including Constitutional, Eyes, Respiratory, Cardiovascular, Gastroenterology, Genitourinary, Integumentary, Musculoskeletal, Psychiatric were all negative except for pertinent positives noted in my HPI.     PHYSICAL EXAM  Vitals:    02/01/19 1354   Weight: 59.9 kg (132 lb)     Vital Signs: Wt 59.9 kg (132 lb)   BMI 20.67 kg/m   Patient declined being weighed. Body mass index is 20.67 kg/m .    General  - normal appearance, in no obvious distress  CV  - normal peripheral perfusion  Pulm  - normal respiratory pattern, non-labored  Musculoskeletal - lumbar spine  - stance: normal gait without limp, no obvious leg length discrepancy, normal heel and toe walk  - inspection: normal bone and joint alignment, no obvious scoliosis  - palpation: no paravertebral or bony tenderness  - ROM: flexion exacerbates pain, normal extension, sidebending, rotation  - strength: lower extremities 5/5 in all planes  - special tests:  (+) straight leg raise  (+) slump test  Neuro  - patellar and Achilles DTRs 2+ bilaterally, lower extremity sensory deficit throughout L5 distribution, grossly normal coordination, normal muscle tone  Skin  - no ecchymosis, erythema, warmth, or induration, no obvious rash  Psych  - interactive, appropriate, normal mood and affect  ASSESSMENT & PLAN  33 yo male with lumbar ddd, radicular pain  Reviewed previous imaging and ordered lumbar MRI  Will consider LATOYA  Medrol pack and  gabapentin start  F/u in 2 weeks      Angel Saeed MD, CAQSM

## 2019-02-01 NOTE — LETTER
RE: Lexy Cadena  [address]  [facility] [address]     Dear Colleague,    Thank you for referring your patient, Lexy Cadena, to the [facility]. Please see a copy of my visit note below.    HISTORY OF PRESENT ILLNESS  Mr. Cadena is a pleasant 34 year old year old male who presents to clinic today with   Lexy explains that   Location:   Quality:  achy jerman    Duration:   Timing: occurs intermittently  Context: occurs while exercising and lifting  Modifying factors:  resting and non-use makes it better, movement and use makes it worse  Associated signs & symptoms: none  Previous similar pain: yes  Exercise: lifting weights and cardiovascular on machine  Additional history: as documented    MEDICAL HISTORY  Patient Active Problem List   Diagnosis     TB (tuberculosis)     Back pain     Low back pain radiating to right leg     CARDIOVASCULAR SCREENING; LDL GOAL LESS THAN 160     Low back pain     Back muscle spasm     Facet arthropathy, lumbosacral       Current Outpatient Medications   Medication Sig Dispense Refill     acetaminophen (ACETAMINOPHEN EXTRA STRENGTH) 500 MG tablet Take 1-2 tablets (500-1,000 mg) by mouth every 6 hours as needed for mild pain 100 tablet 1     acetaminophen (TYLENOL) 325 MG tablet Take 2 tablets (650 mg) by mouth every 4 hours as needed for mild pain 60 tablet 0     cyclobenzaprine (FLEXERIL) 10 MG tablet Take 1 tablet by mouth 3 times daily as needed for muscle spasms. 30 tablet 1     ethambutol (MYAMBUTOL) 400 MG tablet Take 3 tablets (1,200 mg) by mouth daily 90 tablet 2     ibuprofen (ADVIL/MOTRIN) 600 MG tablet Take 1 tablet (600 mg) by mouth every 6 hours as needed for moderate pain 30 tablet 0     loratadine (CLARITIN) 10 MG tablet Take 1 tablet (10 mg) by mouth daily 30 tablet 12     meloxicam (MOBIC) 15 MG tablet Take 1 tablet (15 mg) by mouth daily 30 tablet 1     methylPREDNISolone (MEDROL DOSEPAK) 4 MG  tablet Follow package instructions 21 tablet 0     naproxen sodium (ANAPROX) 220 MG tablet Take 1 tablet (220 mg) by mouth 2 times daily (with meals) 60 tablet 0     neomycin-polymyxin-hydrocortisone (CORTISPORIN) 3.5-73984-1 otic suspension Place 4 drops into the right ear 4 times daily 10 mL 0     rifampin (RIFADIN) 300 MG capsule Take 1 capsule (300 mg) by mouth daily 30 capsule 2     tiotropium (SPIRIVA RESPIMAT) 2.5 MCG/ACT inhalation aerosol Inhale 2 puffs into the lungs daily 12 g 3       No Known Allergies    No family history on file.    Additional medical/Social/Surgical histories reviewed in [facility] and updated as appropriate.     PHYSICAL EXAM  Vitals:    02/01/19 1354   Weight: 59.9 kg (132 lb)     Vital Signs: Wt 59.9 kg (132 lb)   BMI 20.67 kg/m    Patient declined being weighed. Body mass index is 20.67 kg/m .    General  - normal appearance, in no obvious distress  CV  - normal peripheral perfusion  Pulm  - normal respiratory pattern, non-labored  Musculoskeletal - lumbar spine  - stance: normal gait without limp, no obvious leg length discrepancy, normal heel and toe walk  - inspection: normal bone and joint alignment, no obvious scoliosis  - palpation: no paravertebral or bony tenderness  - ROM: flexion exacerbates pain, normal extension, sidebending, rotation  - strength: lower extremities 5/5 in all planes  - special tests:  (+) straight leg raise  (+) slump test  Neuro  - patellar and Achilles DTRs 2+ bilaterally, lower extremity sensory deficit throughout L5 distribution, grossly normal coordination, normal muscle tone  Skin  - no ecchymosis, erythema, warmth, or induration, no obvious rash  Psych  - interactive, appropriate, normal mood and affect  ASSESSMENT & PLAN    Again, thank you for allowing me to participate in the care of your patient.      Sincerely,    Angel Saeed MD

## 2019-02-04 ENCOUNTER — ANCILLARY PROCEDURE (OUTPATIENT)
Dept: MRI IMAGING | Facility: CLINIC | Age: 34
End: 2019-02-04
Payer: COMMERCIAL

## 2019-02-04 DIAGNOSIS — M54.16 LUMBAR RADICULAR PAIN: ICD-10-CM

## 2019-02-04 DIAGNOSIS — M62.830 BACK MUSCLE SPASM: ICD-10-CM

## 2019-02-22 ENCOUNTER — OFFICE VISIT (OUTPATIENT)
Dept: ORTHOPEDICS | Facility: CLINIC | Age: 34
End: 2019-02-22
Payer: COMMERCIAL

## 2019-02-22 DIAGNOSIS — M51.16 LUMBAR DISC HERNIATION WITH RADICULOPATHY: Primary | ICD-10-CM

## 2019-02-22 NOTE — LETTER
2/22/2019       RE: Lexy Cadena  [address]  [facility] [address]     Dear Colleague,    Thank you for referring your patient, Lexy Cadena, to the [facility] at [facility]. Please see a copy of my visit note below.    HISTORY OF PRESENT ILLNESS  Mr. Cadena is a pleasant 34 year old year old male who presents to clinic today with followup for lumbar radicular pain  He states that it is overall not getting much better   Had lumbar MRI and would like to discuss  He had used medrol and gabapentin and feels like it helps some but now is getting worse again    MEDICAL HISTORY  Patient Active Problem List   Diagnosis     TB (tuberculosis)     Back pain     Low back pain radiating to right leg     CARDIOVASCULAR SCREENING; LDL GOAL LESS THAN 160     Low back pain     Back muscle spasm     Facet arthropathy, lumbosacral       Current Outpatient Medications   Medication Sig Dispense Refill     acetaminophen (ACETAMINOPHEN EXTRA STRENGTH) 500 MG tablet Take 1-2 tablets (500-1,000 mg) by mouth every 6 hours as needed for mild pain 100 tablet 1     acetaminophen (TYLENOL) 325 MG tablet Take 2 tablets (650 mg) by mouth every 4 hours as needed for mild pain 60 tablet 0     cyclobenzaprine (FLEXERIL) 10 MG tablet Take 1 tablet by mouth 3 times daily as needed for muscle spasms. 30 tablet 1     ethambutol (MYAMBUTOL) 400 MG tablet Take 3 tablets (1,200 mg) by mouth daily 90 tablet 2     gabapentin (NEURONTIN) 300 MG capsule Take 1 capsule (300 mg) by mouth 3 times daily 90 capsule 3     ibuprofen (ADVIL/MOTRIN) 600 MG tablet Take 1 tablet (600 mg) by mouth every 6 hours as needed for moderate pain 30 tablet 0     loratadine (CLARITIN) 10 MG tablet Take 1 tablet (10 mg) by mouth daily 30 tablet 12     meloxicam (MOBIC) 15 MG tablet Take 1 tablet (15 mg) by mouth daily 30 tablet 1     methylPREDNISolone (MEDROL DOSEPAK) 4 MG tablet Follow package instructions 21 tablet 0      methylPREDNISolone (MEDROL) 4 MG tablet therapy pack Follow Package Directions 21 tablet 0     methylPREDNISolone (MEDROL) 4 MG tablet follow package directions 1 Package 0     naproxen sodium (ANAPROX) 220 MG tablet Take 1 tablet (220 mg) by mouth 2 times daily (with meals) 60 tablet 0     neomycin-polymyxin-hydrocortisone (CORTISPORIN) 3.5-59277-5 otic suspension Place 4 drops into the right ear 4 times daily 10 mL 0     rifampin (RIFADIN) 300 MG capsule Take 1 capsule (300 mg) by mouth daily 30 capsule 2     tiotropium (SPIRIVA RESPIMAT) 2.5 MCG/ACT inhalation aerosol Inhale 2 puffs into the lungs daily 12 g 3       No Known Allergies    No family history on file.    Additional medical/Social/Surgical histories reviewed in [facility] and updated as appropriate.     REVIEW OF SYSTEMS (3/25/2019)  10 point ROS of systems including Constitutional, Eyes, Respiratory, Cardiovascular, Gastroenterology, Genitourinary, Integumentary, Musculoskeletal, Psychiatric were all negative except for pertinent positives noted in my HPI.     PHYSICAL EXAM  Vital Signs: There were no vitals taken for this visit. Patient declined being weighed. There is no height or weight on file to calculate BMI.    General  - normal appearance, in no obvious distress  CV  - normal peripheral perfusion  Pulm  - normal respiratory pattern, non-labored  Musculoskeletal - lumbar spine  - stance: normal gait without limp, no obvious leg length discrepancy, normal heel and toe walk  - inspection: normal bone and joint alignment, no obvious scoliosis  - palpation: no paravertebral or bony tenderness  - ROM: flexion exacerbates pain, normal extension, sidebending, rotation  - strength: lower extremities 5/5 in all planes  - special tests:  (+) straight leg raise  (+) slump test  Neuro  - patellar and Achilles DTRs 2+ bilaterally, bilateral lower extremity sensory deficit throughout L5 distribution, grossly normal coordination, normal muscle tone  Skin  - no  ecchymosis, erythema, warmth, or induration, no obvious rash  Psych  - interactive, appropriate, normal mood and affect  ASSESSMENT & PLAN  33 yo male with lumbar disc herniation, radicular pain  Reviewed lumbar MRI  Ordered Denice  Cont. HEP  Cont. Gabapentin  F/u after Denice    Angel Saeed MD, CAQSM

## 2019-02-22 NOTE — NURSING NOTE
Reason For Visit:   Chief Complaint   Patient presents with     RECHECK     Follow up MRI        There were no vitals taken for this visit.    Pain Assessment  Patient Currently in Pain: Yes  0-10 Pain Scale: 5  Primary Pain Location: Back(some radiating down the right leg)    Ayesha Pantoja, ATC

## 2019-03-25 NOTE — PROGRESS NOTES
HISTORY OF PRESENT ILLNESS  Mr. Cadena is a pleasant 34 year old year old male who presents to clinic today with followup for lumbar radicular pain  He states that it is overall not getting much better   Had lumbar MRI and would like to discuss  He had used medrol and gabapentin and feels like it helps some but now is getting worse again    MEDICAL HISTORY  Patient Active Problem List   Diagnosis     TB (tuberculosis)     Back pain     Low back pain radiating to right leg     CARDIOVASCULAR SCREENING; LDL GOAL LESS THAN 160     Low back pain     Back muscle spasm     Facet arthropathy, lumbosacral       Current Outpatient Medications   Medication Sig Dispense Refill     acetaminophen (ACETAMINOPHEN EXTRA STRENGTH) 500 MG tablet Take 1-2 tablets (500-1,000 mg) by mouth every 6 hours as needed for mild pain 100 tablet 1     acetaminophen (TYLENOL) 325 MG tablet Take 2 tablets (650 mg) by mouth every 4 hours as needed for mild pain 60 tablet 0     cyclobenzaprine (FLEXERIL) 10 MG tablet Take 1 tablet by mouth 3 times daily as needed for muscle spasms. 30 tablet 1     ethambutol (MYAMBUTOL) 400 MG tablet Take 3 tablets (1,200 mg) by mouth daily 90 tablet 2     gabapentin (NEURONTIN) 300 MG capsule Take 1 capsule (300 mg) by mouth 3 times daily 90 capsule 3     ibuprofen (ADVIL/MOTRIN) 600 MG tablet Take 1 tablet (600 mg) by mouth every 6 hours as needed for moderate pain 30 tablet 0     loratadine (CLARITIN) 10 MG tablet Take 1 tablet (10 mg) by mouth daily 30 tablet 12     meloxicam (MOBIC) 15 MG tablet Take 1 tablet (15 mg) by mouth daily 30 tablet 1     methylPREDNISolone (MEDROL DOSEPAK) 4 MG tablet Follow package instructions 21 tablet 0     methylPREDNISolone (MEDROL) 4 MG tablet therapy pack Follow Package Directions 21 tablet 0     methylPREDNISolone (MEDROL) 4 MG tablet follow package directions 1 Package 0     naproxen sodium (ANAPROX) 220 MG tablet Take 1 tablet (220 mg) by mouth 2 times daily (with meals) 60  tablet 0     neomycin-polymyxin-hydrocortisone (CORTISPORIN) 3.5-13150-5 otic suspension Place 4 drops into the right ear 4 times daily 10 mL 0     rifampin (RIFADIN) 300 MG capsule Take 1 capsule (300 mg) by mouth daily 30 capsule 2     tiotropium (SPIRIVA RESPIMAT) 2.5 MCG/ACT inhalation aerosol Inhale 2 puffs into the lungs daily 12 g 3       No Known Allergies    No family history on file.    Additional medical/Social/Surgical histories reviewed in [facility] and updated as appropriate.     REVIEW OF SYSTEMS (3/25/2019)  10 point ROS of systems including Constitutional, Eyes, Respiratory, Cardiovascular, Gastroenterology, Genitourinary, Integumentary, Musculoskeletal, Psychiatric were all negative except for pertinent positives noted in my HPI.     PHYSICAL EXAM  Vital Signs: There were no vitals taken for this visit. Patient declined being weighed. There is no height or weight on file to calculate BMI.    General  - normal appearance, in no obvious distress  CV  - normal peripheral perfusion  Pulm  - normal respiratory pattern, non-labored  Musculoskeletal - lumbar spine  - stance: normal gait without limp, no obvious leg length discrepancy, normal heel and toe walk  - inspection: normal bone and joint alignment, no obvious scoliosis  - palpation: no paravertebral or bony tenderness  - ROM: flexion exacerbates pain, normal extension, sidebending, rotation  - strength: lower extremities 5/5 in all planes  - special tests:  (+) straight leg raise  (+) slump test  Neuro  - patellar and Achilles DTRs 2+ bilaterally, bilateral lower extremity sensory deficit throughout L5 distribution, grossly normal coordination, normal muscle tone  Skin  - no ecchymosis, erythema, warmth, or induration, no obvious rash  Psych  - interactive, appropriate, normal mood and affect  ASSESSMENT & PLAN  35 yo male with lumbar disc herniation, radicular pain  Reviewed lumbar MRI  Ordered Denice  Cont. HEP  Cont. Gabapentin  F/u after  Santosh      Angel Saeed MD, CAM

## 2019-03-26 ENCOUNTER — TELEPHONE (OUTPATIENT)
Dept: INTERVENTIONAL RADIOLOGY/VASCULAR | Facility: CLINIC | Age: 34
End: 2019-03-26

## 2019-03-27 ENCOUNTER — APPOINTMENT (OUTPATIENT)
Dept: MEDSURG UNIT | Facility: CLINIC | Age: 34
End: 2019-03-27
Attending: PREVENTIVE MEDICINE
Payer: COMMERCIAL

## 2019-03-27 ENCOUNTER — HOSPITAL ENCOUNTER (OUTPATIENT)
Dept: GENERAL RADIOLOGY | Facility: CLINIC | Age: 34
End: 2019-03-27
Attending: PREVENTIVE MEDICINE | Admitting: PREVENTIVE MEDICINE
Payer: COMMERCIAL

## 2019-03-27 ENCOUNTER — HOSPITAL ENCOUNTER (OUTPATIENT)
Facility: CLINIC | Age: 34
Discharge: HOME | End: 2019-03-27
Attending: PREVENTIVE MEDICINE | Admitting: RADIOLOGY
Payer: COMMERCIAL

## 2019-03-27 VITALS
DIASTOLIC BLOOD PRESSURE: 76 MMHG | RESPIRATION RATE: 16 BRPM | HEART RATE: 61 BPM | OXYGEN SATURATION: 100 % | BODY MASS INDEX: 21.82 KG/M2 | HEIGHT: 67 IN | SYSTOLIC BLOOD PRESSURE: 117 MMHG | TEMPERATURE: 97.5 F | WEIGHT: 139 LBS

## 2019-03-27 VITALS
HEART RATE: 57 BPM | SYSTOLIC BLOOD PRESSURE: 121 MMHG | RESPIRATION RATE: 16 BRPM | OXYGEN SATURATION: 98 % | DIASTOLIC BLOOD PRESSURE: 77 MMHG

## 2019-03-27 DIAGNOSIS — M51.16 LUMBAR DISC HERNIATION WITH RADICULOPATHY: ICD-10-CM

## 2019-03-27 LAB
APTT PPP: 28 SEC (ref 22–37)
INR PPP: 1.15 (ref 0.86–1.14)
PLATELET # BLD AUTO: 154 10E9/L (ref 150–450)

## 2019-03-27 PROCEDURE — T1013 SIGN LANG/ORAL INTERPRETER: HCPCS | Mod: U3

## 2019-03-27 PROCEDURE — 25000125 ZZHC RX 250: Performed by: RADIOLOGY

## 2019-03-27 PROCEDURE — 25500064 ZZH RX 255 OP 636: Performed by: RADIOLOGY

## 2019-03-27 PROCEDURE — 85730 THROMBOPLASTIN TIME PARTIAL: CPT | Performed by: PREVENTIVE MEDICINE

## 2019-03-27 PROCEDURE — 85610 PROTHROMBIN TIME: CPT | Performed by: PREVENTIVE MEDICINE

## 2019-03-27 PROCEDURE — 25000128 H RX IP 250 OP 636: Performed by: RADIOLOGY

## 2019-03-27 PROCEDURE — 62323 NJX INTERLAMINAR LMBR/SAC: CPT

## 2019-03-27 PROCEDURE — 40000168 ZZH STATISTIC PP CARE STAGE 3

## 2019-03-27 PROCEDURE — 85049 AUTOMATED PLATELET COUNT: CPT | Performed by: PREVENTIVE MEDICINE

## 2019-03-27 RX ORDER — BUPIVACAINE HYDROCHLORIDE 5 MG/ML
30 INJECTION, SOLUTION PERINEURAL ONCE
Status: COMPLETED | OUTPATIENT
Start: 2019-03-27 | End: 2019-03-27

## 2019-03-27 RX ORDER — IOPAMIDOL 408 MG/ML
7 INJECTION, SOLUTION INTRATHECAL ONCE
Status: COMPLETED | OUTPATIENT
Start: 2019-03-27 | End: 2019-03-27

## 2019-03-27 RX ORDER — NICOTINE POLACRILEX 4 MG
15-30 LOZENGE BUCCAL
Status: DISCONTINUED | OUTPATIENT
Start: 2019-03-27 | End: 2019-03-27 | Stop reason: HOSPADM

## 2019-03-27 RX ORDER — LIDOCAINE HYDROCHLORIDE 10 MG/ML
10 INJECTION, SOLUTION EPIDURAL; INFILTRATION; INTRACAUDAL; PERINEURAL ONCE
Status: COMPLETED | OUTPATIENT
Start: 2019-03-27 | End: 2019-03-27

## 2019-03-27 RX ORDER — METHYLPREDNISOLONE ACETATE 80 MG/ML
80 INJECTION, SUSPENSION INTRA-ARTICULAR; INTRALESIONAL; INTRAMUSCULAR; SOFT TISSUE ONCE
Status: COMPLETED | OUTPATIENT
Start: 2019-03-27 | End: 2019-03-27

## 2019-03-27 RX ORDER — DEXTROSE MONOHYDRATE 25 G/50ML
25-50 INJECTION, SOLUTION INTRAVENOUS
Status: DISCONTINUED | OUTPATIENT
Start: 2019-03-27 | End: 2019-03-27 | Stop reason: HOSPADM

## 2019-03-27 RX ADMIN — LIDOCAINE HYDROCHLORIDE 5 ML: 10 INJECTION, SOLUTION EPIDURAL; INFILTRATION; INTRACAUDAL; PERINEURAL at 14:13

## 2019-03-27 RX ADMIN — METHYLPREDNISOLONE ACETATE 80 MG: 80 INJECTION, SUSPENSION INTRA-ARTICULAR; INTRALESIONAL; INTRAMUSCULAR; SOFT TISSUE at 14:13

## 2019-03-27 RX ADMIN — BUPIVACAINE HYDROCHLORIDE 2 ML: 5 INJECTION, SOLUTION EPIDURAL; INTRACAUDAL; PERINEURAL at 14:13

## 2019-03-27 RX ADMIN — IOPAMIDOL 7 ML: 408 INJECTION, SOLUTION INTRATHECAL at 14:14

## 2019-03-27 ASSESSMENT — MIFFLIN-ST. JEOR: SCORE: 1519.13

## 2019-03-27 NOTE — PROGRESS NOTES
Prep and teaching complete for epidural steroid injection; Juan Diego Varghese will  after. Pt has [place]  here for all cares. Awaiting lab results, otherwise ready for procedure.

## 2019-03-27 NOTE — IP AVS SNAPSHOT
[address] [address]                                    After Visit Summary   3/27/2019    Lexy Cadena    MRN: [id number]           After Visit Summary Signature Page    I have received my discharge instructions, and my questions have been answered. I have discussed any challenges I see with this plan with the nurse or doctor.    ..........................................................................................................................................  Patient/Patient Representative Signature      ..........................................................................................................................................  Patient Representative Print Name and Relationship to Patient    ..................................................               ................................................  Date                                   Time    ..........................................................................................................................................  Reviewed by Signature/Title    ...................................................              ..............................................  Date                                               Time          22EPIC Rev 08/18

## 2019-03-27 NOTE — PROGRESS NOTES
Pt up walking, steady on his feet; pt clarified he had his discharge instructions.  at bedside. Tolerated PO. Site dry and intact. 1520--Discharged to home with family.

## 2019-03-27 NOTE — IP AVS SNAPSHOT
MRN:[id number]                      After Visit Summary   3/27/2019    Lexy Cadena    MRN: [id number]           Visit Information        Department      3/27/2019 11:49 AM [address] [facility]          Review of your medicines      UNREVIEWED medicines. Ask your doctor about these medicines       Dose / Directions   * acetaminophen 325 MG tablet  Commonly known as:  TYLENOL      Dose:  650 mg  Take 2 tablets (650 mg) by mouth every 4 hours as needed for mild pain  Quantity:  60 tablet  Refills:  0     * acetaminophen 500 MG tablet  Commonly known as:  ACETAMINOPHEN EXTRA STRENGTH  Used for:  Chronic bilateral low back pain without sciatica      Dose:  500-1000 mg  Take 1-2 tablets (500-1,000 mg) by mouth every 6 hours as needed for mild pain  Quantity:  100 tablet  Refills:  1     cyclobenzaprine 10 MG tablet  Commonly known as:  FLEXERIL  Used for:  Mid back pain      Dose:  10 mg  Take 1 tablet by mouth 3 times daily as needed for muscle spasms.  Quantity:  30 tablet  Refills:  1     ethambutol 400 MG tablet  Commonly known as:  MYAMBUTOL  Used for:  Mycobacterium avium complex (H)      Dose:  1200 mg  Take 3 tablets (1,200 mg) by mouth daily  Quantity:  90 tablet  Refills:  2     gabapentin 300 MG capsule  Commonly known as:  NEURONTIN  Used for:  Back muscle spasm, Lumbar radicular pain      Dose:  300 mg  Take 1 capsule (300 mg) by mouth 3 times daily  Quantity:  90 capsule  Refills:  3     ibuprofen 600 MG tablet  Commonly known as:  ADVIL/MOTRIN      Dose:  600 mg  Take 1 tablet (600 mg) by mouth every 6 hours as needed for moderate pain  Quantity:  30 tablet  Refills:  0     loratadine 10 MG tablet  Commonly known as:  CLARITIN  Used for:  Cough, Shortness of breath      Dose:  10 mg  Take 1 tablet (10 mg) by mouth daily  Quantity:  30 tablet  Refills:  12     meloxicam 15 MG tablet  Commonly known as:  MOBIC  Used for:  Radicular pain of lumbosacral region      Dose:  15 mg  Take 1  tablet (15 mg) by mouth daily  Quantity:  30 tablet  Refills:  1     * methylPREDNISolone 4 MG tablet therapy pack  Commonly known as:  MEDROL DOSEPAK  Used for:  Radicular pain of lumbosacral region      Follow package instructions  Quantity:  21 tablet  Refills:  0     * methylPREDNISolone 4 MG tablet  Commonly known as:  MEDROL  Used for:  Back muscle spasm, Lumbar radicular pain      follow package directions  Quantity:  1 Package  Refills:  0     * methylPREDNISolone 4 MG tablet therapy pack  Commonly known as:  MEDROL  Used for:  Back muscle spasm, Lumbar radicular pain      Follow Package Directions  Quantity:  21 tablet  Refills:  0     naproxen sodium 220 MG tablet  Commonly known as:  ANAPROX  Used for:  Chronic bilateral low back pain without sciatica      Dose:  220 mg  Take 1 tablet (220 mg) by mouth 2 times daily (with meals)  Quantity:  60 tablet  Refills:  0     neomycin-polymyxin-hydrocortisone 3.5-95838-8 otic suspension  Commonly known as:  CORTISPORIN      Dose:  4 drop  Place 4 drops into the right ear 4 times daily  Quantity:  10 mL  Refills:  0     rifampin 300 MG capsule  Commonly known as:  RIFADIN  Used for:  Mycobacterium avium complex (H)      Dose:  300 mg  Take 1 capsule (300 mg) by mouth daily  Quantity:  30 capsule  Refills:  2     tiotropium 2.5 MCG/ACT inhaler  Commonly known as:  SPIRIVA RESPIMAT  Used for:  SOB (shortness of breath)      Dose:  2 puff  Inhale 2 puffs into the lungs daily  Quantity:  12 g  Refills:  3         * This list has 5 medication(s) that are the same as other medications prescribed for you. Read the directions carefully, and ask your doctor or other care provider to review them with you.                  Protect others around you: Learn how to safely use, store and throw away your medicines at www.disposemymeds.org.       Follow-ups after your visit       Your next 10 appointments already scheduled    Apr 12, 2019  2:40 PM CDT  (Arrive by 2:25 PM)  Return  Visit with Angel Saeed MD  [facility] ([facility]) [address]  4th [address]  [place] [address] [phone]      Care Instructions       Further instructions from your care team       [facility]    Discharge Instructions for Epidural Steroid Injection    Care of injection site:    If you have new numbness down your leg after the injection, this may last up to 4-6 hours, but should go away. You may need help with walking until your leg feels normal.    Over the next 24 to 48 hour, pain at the injection site may increase before it gets better.    For the next 48 hours, use ice packs for 15 minutes,3-4 times a day for pain relief.    If you have a bandage, you may remove it the next morning            Activity:    If you had sedation, you may feel sleepy, forgetful or unsteady.    Do not drink alcohol for 24 hours.    Do not drive until morning    Limit you activity based on you pain level. Follow the doctor's orders for activity.    You may eat a normal diet.  Medicines:    Restart all your medicines today at your regular dose, including Coumadin (Warfarin).    If you are restarting Coumadin, talk to your doctor about having your INR checked.    Restart you platelet inhibitors and aspirin tomorrow.  For steroid shots: The steroid should help reduce swelling and pain. This may take from 3-14 days. Side effects from the shot will be mild and go away in 2-3 days.   Common side effects may include:    Insomnia    Heartburn    Flushed face    Water retention    Increased appetite    Increased blood sugar  If you have diabetes, watch you blood sugar closely, If needed, call your doctor to help control you blood sugar.    Some patient will get lasting relief from a single injection. Others may require up to 3 injections to get results. If you have more than one steroid injection, they should be given 2 weeks apart. Some patients do not  "receive relief of symptoms.    Call your doctor or go to the Emergency Room if you have severe pain, fever, or problems with your bowel or bladder control.    [facility] RADIOLOGY DEPARTMENT               Procedure Physician: Dr Vuong                                         Date:2019               Telephone Numbers:  [phone]   After 4:30PM on weekdays, weekends and Holidays. Ask for the Radiologist on call. Someone is available 24 hours a day.          Additional Information About Your Visit       [employer] Information    [employer] lets you send messages to your doctor, view your test results, renew your prescriptions, schedule appointments and more. To sign up, go to [email or web address] . Click on \"Log in\" on the left side of the screen, which will take you to the Welcome page. Then click on \"Sign up Now\" on the right side of the page.     You will be asked to enter the access code listed below, as well as some personal information. Please follow the directions to create your username and password.     Your access code is: [email or web address]  Expires: 2019  2:50 PM     Your access code will  in 60 days. If you need help or a new code, please call your [facility] clinic or [phone].       Care EveryWhere ID    This is your Care EveryWhere ID. This could be used by other organizations to access your [facility] medical records  [id number]       Your Vitals Were     Blood Pressure   115/77 (BP Location: Left arm)          Pulse   76          Temperature   97.5  F (36.4  C) (Oral)          Respirations   16          Height   1.702 m (5' 7\")             Weight   63 kg (139 lb)    Pulse Oximetry   97%    BMI (Body Mass Index)   21.77 kg/m           Primary Care Provider Fax #    [facility] [phone]      Equal Access to Services    MARIANA ESCOBAR AH: Mike Valdez, maude hernandes, alis galicia, shital be. So wac " [phone].    ATENCIÓN: Si vish sutherland, tiene a gupta disposición servicios gratuitos de asistencia lingüística. Tyler ibarra [phone].    We comply with applicable federal civil rights laws and [place] laws. We do not discriminate on the basis of race, color, national origin, age, disability, sex, sexual orientation, or gender identity.           Thank you!    Thank you for choosing [facility] for your care. Our goal is always to provide you with excellent care. Hearing back from our patients is one way we can continue to improve our services. Please take a few minutes to complete the written survey that you may receive in the mail after you visit with us. Thank you!            Medication List      ASK your doctor about these medications          Morning Afternoon Evening Bedtime As Needed    * acetaminophen 325 MG tablet  Also known as:  TYLENOL  INSTRUCTIONS:  Take 2 tablets (650 mg) by mouth every 4 hours as needed for mild pain                     * acetaminophen 500 MG tablet  Also known as:  ACETAMINOPHEN EXTRA STRENGTH  INSTRUCTIONS:  Take 1-2 tablets (500-1,000 mg) by mouth every 6 hours as needed for mild pain                     cyclobenzaprine 10 MG tablet  Also known as:  FLEXERIL  INSTRUCTIONS:  Take 1 tablet by mouth 3 times daily as needed for muscle spasms.                     ethambutol 400 MG tablet  Also known as:  MYAMBUTOL  INSTRUCTIONS:  Take 3 tablets (1,200 mg) by mouth daily                     gabapentin 300 MG capsule  Also known as:  NEURONTIN  INSTRUCTIONS:  Take 1 capsule (300 mg) by mouth 3 times daily                     ibuprofen 600 MG tablet  Also known as:  ADVIL/MOTRIN  INSTRUCTIONS:  Take 1 tablet (600 mg) by mouth every 6 hours as needed for moderate pain                     loratadine 10 MG tablet  Also known as:  CLARITIN  INSTRUCTIONS:  Take 1 tablet (10 mg) by mouth daily  Doctor's comments:  May substitute over the counter if lower out of pocket cost                      meloxicam 15 MG tablet  Also known as:  MOBIC  INSTRUCTIONS:  Take 1 tablet (15 mg) by mouth daily  Doctor's comments:  Take with food                     * methylPREDNISolone 4 MG tablet therapy pack  Also known as:  MEDROL DOSEPAK  INSTRUCTIONS:  Follow package instructions  LAST TAKEN:  Ask your nurse or doctor                     * methylPREDNISolone 4 MG tablet  Also known as:  MEDROL  INSTRUCTIONS:  follow package directions  LAST TAKEN:  Ask your nurse or doctor                     * methylPREDNISolone 4 MG tablet therapy pack  Also known as:  MEDROL  INSTRUCTIONS:  Follow Package Directions  LAST TAKEN:  Ask your nurse or doctor                     naproxen sodium 220 MG tablet  Also known as:  ANAPROX  INSTRUCTIONS:  Take 1 tablet (220 mg) by mouth 2 times daily (with meals)                     neomycin-polymyxin-hydrocortisone 3.5-64677-8 otic suspension  Also known as:  CORTISPORIN  INSTRUCTIONS:  Place 4 drops into the right ear 4 times daily                     rifampin 300 MG capsule  Also known as:  RIFADIN  INSTRUCTIONS:  Take 1 capsule (300 mg) by mouth daily                     tiotropium 2.5 MCG/ACT inhaler  Also known as:  SPIRIVA RESPIMAT  INSTRUCTIONS:  Inhale 2 puffs into the lungs daily                        * This list has 5 medication(s) that are the same as other medications prescribed for you. Read the directions carefully, and ask your doctor or other care provider to review them with you.

## 2019-03-27 NOTE — PROGRESS NOTES
Discharge instructions reviewed with pt (with ), pt verbalizes understanding. Pt ambulates with steady gait. PIV d/c'd.  Pt calling his dad for a ride home.

## 2019-03-27 NOTE — DISCHARGE INSTRUCTIONS
[facility]    Discharge Instructions for Epidural Steroid Injection    Care of injection site:    If you have new numbness down your leg after the injection, this may last up to 4-6 hours, but should go away. You may need help with walking until your leg feels normal.    Over the next 24 to 48 hour, pain at the injection site may increase before it gets better.    For the next 48 hours, use ice packs for 15 minutes,3-4 times a day for pain relief.    If you have a bandage, you may remove it the next morning            Activity:    If you had sedation, you may feel sleepy, forgetful or unsteady.    Do not drink alcohol for 24 hours.    Do not drive until morning    Limit you activity based on you pain level. Follow the doctor's orders for activity.    You may eat a normal diet.  Medicines:    Restart all your medicines today at your regular dose, including Coumadin (Warfarin).    If you are restarting Coumadin, talk to your doctor about having your INR checked.    Restart you platelet inhibitors and aspirin tomorrow.  For steroid shots: The steroid should help reduce swelling and pain. This may take from 3-14 days. Side effects from the shot will be mild and go away in 2-3 days.   Common side effects may include:    Insomnia    Heartburn    Flushed face    Water retention    Increased appetite    Increased blood sugar  If you have diabetes, watch you blood sugar closely, If needed, call your doctor to help control you blood sugar.    Some patient will get lasting relief from a single injection. Others may require up to 3 injections to get results. If you have more than one steroid injection, they should be given 2 weeks apart. Some patients do not receive relief of symptoms.    Call your doctor or go to the Emergency Room if you have severe pain, fever, or problems with your bowel or bladder control.    [facility] RADIOLOGY DEPARTMENT               Procedure Physician: Dr Vuong                                          Date:March 27, 2019               Telephone Numbers:  [phone]   After 4:30PM on weekdays, weekends and Holidays. Ask for the Radiologist on call. Someone is available 24 hours a day.

## 2019-03-27 NOTE — PROGRESS NOTES
Returned to unit following epidural steroid injection. Lower back dressing dry and intact. Alert and orient. No complaint of nausea or discomfort. Respiratory status stable. Vital signs within normal limits.

## 2019-04-12 ENCOUNTER — OFFICE VISIT (OUTPATIENT)
Dept: ORTHOPEDICS | Facility: CLINIC | Age: 34
End: 2019-04-12
Payer: COMMERCIAL

## 2019-04-12 DIAGNOSIS — M79.672 PAIN IN BOTH FEET: ICD-10-CM

## 2019-04-12 DIAGNOSIS — M51.369 DDD (DEGENERATIVE DISC DISEASE), LUMBAR: Primary | ICD-10-CM

## 2019-04-12 DIAGNOSIS — M79.671 PAIN IN BOTH FEET: ICD-10-CM

## 2019-04-12 RX ORDER — GABAPENTIN 300 MG/1
300 CAPSULE ORAL 3 TIMES DAILY
Qty: 90 CAPSULE | Refills: 1 | Status: SHIPPED | OUTPATIENT
Start: 2019-04-12 | End: 2022-04-08

## 2019-04-12 ASSESSMENT — PATIENT HEALTH QUESTIONNAIRE - PHQ9
10. IF YOU CHECKED OFF ANY PROBLEMS, HOW DIFFICULT HAVE THESE PROBLEMS MADE IT FOR YOU TO DO YOUR WORK, TAKE CARE OF THINGS AT HOME, OR GET ALONG WITH OTHER PEOPLE: NOT DIFFICULT AT ALL
SUM OF ALL RESPONSES TO PHQ QUESTIONS 1-9: 0
SUM OF ALL RESPONSES TO PHQ QUESTIONS 1-9: 0

## 2019-04-12 NOTE — NURSING NOTE
Reason For Visit:   Chief Complaint   Patient presents with     Lower Back - Follow Up     Follow Up     Lumbar injection 3/27/19        There were no vitals taken for this visit.    Pain Assessment  Patient Currently in Pain: Yes  0-10 Pain Scale: 5  Primary Pain Location: Back  Pain Descriptors: Numbness    Praveen Vazquez CMA

## 2019-04-12 NOTE — PROGRESS NOTES
HISTORY OF PRESENT ILLNESS  Mr. Cadena is a pleasant 34 year old year old male who presents to clinic today for followup for lumbar injection  He overall feels improvement in his low back pain and decreased radiation of pain into right leg  He has some numbness in right leg still but it is getting better  Stopped taking gabapentin  Has right hip pain that has persisted as well    MEDICAL HISTORY  Patient Active Problem List   Diagnosis     TB (tuberculosis)     Low back pain radiating to right leg     CARDIOVASCULAR SCREENING; LDL GOAL LESS THAN 160       Current Outpatient Medications   Medication Sig Dispense Refill     acetaminophen (ACETAMINOPHEN EXTRA STRENGTH) 500 MG tablet Take 1-2 tablets (500-1,000 mg) by mouth every 6 hours as needed for mild pain 100 tablet 1     acetaminophen (TYLENOL) 325 MG tablet Take 2 tablets (650 mg) by mouth every 4 hours as needed for mild pain 60 tablet 0     cyclobenzaprine (FLEXERIL) 10 MG tablet Take 1 tablet by mouth 3 times daily as needed for muscle spasms. 30 tablet 1     ethambutol (MYAMBUTOL) 400 MG tablet Take 3 tablets (1,200 mg) by mouth daily 90 tablet 2     gabapentin (NEURONTIN) 300 MG capsule Take 1 capsule (300 mg) by mouth 3 times daily 90 capsule 3     ibuprofen (ADVIL/MOTRIN) 600 MG tablet Take 1 tablet (600 mg) by mouth every 6 hours as needed for moderate pain 30 tablet 0     loratadine (CLARITIN) 10 MG tablet Take 1 tablet (10 mg) by mouth daily 30 tablet 12     meloxicam (MOBIC) 15 MG tablet Take 1 tablet (15 mg) by mouth daily 30 tablet 1     methylPREDNISolone (MEDROL DOSEPAK) 4 MG tablet Follow package instructions 21 tablet 0     methylPREDNISolone (MEDROL) 4 MG tablet therapy pack Follow Package Directions 21 tablet 0     methylPREDNISolone (MEDROL) 4 MG tablet follow package directions 1 Package 0     naproxen sodium (ANAPROX) 220 MG tablet Take 1 tablet (220 mg) by mouth 2 times daily (with meals) 60 tablet 0     neomycin-polymyxin-hydrocortisone  (CORTISPORIN) 3.5-18981-1 otic suspension Place 4 drops into the right ear 4 times daily 10 mL 0     rifampin (RIFADIN) 300 MG capsule Take 1 capsule (300 mg) by mouth daily 30 capsule 2     tiotropium (SPIRIVA RESPIMAT) 2.5 MCG/ACT inhalation aerosol Inhale 2 puffs into the lungs daily 12 g 3       No Known Allergies    No family history on file.    Additional medical/Social/Surgical histories reviewed in [facility] and updated as appropriate.     REVIEW OF SYSTEMS (4/12/2019)  10 point ROS of systems including Constitutional, Eyes, Respiratory, Cardiovascular, Gastroenterology, Genitourinary, Integumentary, Musculoskeletal, Psychiatric were all negative except for pertinent positives noted in my HPI.     PHYSICAL EXAM  VSS  Vital Signs: There were no vitals taken for this visit. Patient declined being weighed. There is no height or weight on file to calculate BMI.    General  - normal appearance, in no obvious distress  CV  - normal peripheral perfusion  Pulm  - normal respiratory pattern, non-labored  Musculoskeletal - lumbar spine  - stance: normal gait without limp, no obvious leg length discrepancy, normal heel and toe walk  - inspection: normal bone and joint alignment, no obvious scoliosis  - palpation: no paravertebral or bony tenderness  - ROM: flexion exacerbates some low back and right hip pain, normal extension, sidebending, rotation  - strength: lower extremities 5/5 in all planes  - special tests:  (+) straight leg raise- right  (+) slump test  Neuro  - patellar and Achilles DTRs 2+ bilaterally, right lower extremity sensory deficit throughout L5 distribution, grossly normal coordination, normal muscle tone  Skin  - no ecchymosis, erythema, warmth, or induration, no obvious rash  Psych  - interactive, appropriate, normal mood and affect  Right hip: has some pain with internal and external rotation  Bilateral feet: has pes planus  ASSESSMENT & PLAN  33 yo with lumbar ddd, improving, and right hip  arthritis  Consider right hip injection next visit  Pes planus: patient requests orthotics referral  Start gabapentin tid  F/u in 3 weeks  followup in 3 weeks, consider another LATOYA as well      Angel Saeed MD, CAQSM

## 2019-04-12 NOTE — LETTER
4/12/2019       RE: Lexy Cadena  [address]  [place] [address]     Dear Colleague,    Thank you for referring your patient, Lexy Cadena, to the [facility]. Please see a copy of my visit note below.    HISTORY OF PRESENT ILLNESS  Mr. Cadena is a pleasant 34 year old year old male who presents to clinic today for followup for lumbar injection  He overall feels improvement in his low back pain and decreased radiation of pain into right leg  He has some numbness in right leg still but it is getting better  Stopped taking gabapentin  Has right hip pain that has persisted as well    MEDICAL HISTORY  Patient Active Problem List   Diagnosis     TB (tuberculosis)     Low back pain radiating to right leg     CARDIOVASCULAR SCREENING; LDL GOAL LESS THAN 160       Current Outpatient Medications   Medication Sig Dispense Refill     acetaminophen (ACETAMINOPHEN EXTRA STRENGTH) 500 MG tablet Take 1-2 tablets (500-1,000 mg) by mouth every 6 hours as needed for mild pain 100 tablet 1     acetaminophen (TYLENOL) 325 MG tablet Take 2 tablets (650 mg) by mouth every 4 hours as needed for mild pain 60 tablet 0     cyclobenzaprine (FLEXERIL) 10 MG tablet Take 1 tablet by mouth 3 times daily as needed for muscle spasms. 30 tablet 1     ethambutol (MYAMBUTOL) 400 MG tablet Take 3 tablets (1,200 mg) by mouth daily 90 tablet 2     gabapentin (NEURONTIN) 300 MG capsule Take 1 capsule (300 mg) by mouth 3 times daily 90 capsule 3     ibuprofen (ADVIL/MOTRIN) 600 MG tablet Take 1 tablet (600 mg) by mouth every 6 hours as needed for moderate pain 30 tablet 0     loratadine (CLARITIN) 10 MG tablet Take 1 tablet (10 mg) by mouth daily 30 tablet 12     meloxicam (MOBIC) 15 MG tablet Take 1 tablet (15 mg) by mouth daily 30 tablet 1     methylPREDNISolone (MEDROL DOSEPAK) 4 MG tablet Follow package instructions 21 tablet 0     methylPREDNISolone (MEDROL) 4 MG tablet therapy  pack Follow Package Directions 21 tablet 0     methylPREDNISolone (MEDROL) 4 MG tablet follow package directions 1 Package 0     naproxen sodium (ANAPROX) 220 MG tablet Take 1 tablet (220 mg) by mouth 2 times daily (with meals) 60 tablet 0     neomycin-polymyxin-hydrocortisone (CORTISPORIN) 3.5-14825-6 otic suspension Place 4 drops into the right ear 4 times daily 10 mL 0     rifampin (RIFADIN) 300 MG capsule Take 1 capsule (300 mg) by mouth daily 30 capsule 2     tiotropium (SPIRIVA RESPIMAT) 2.5 MCG/ACT inhalation aerosol Inhale 2 puffs into the lungs daily 12 g 3       No Known Allergies    No family history on file.    Additional medical/Social/Surgical histories reviewed in [facility] and updated as appropriate.     REVIEW OF SYSTEMS (4/12/2019)  10 point ROS of systems including Constitutional, Eyes, Respiratory, Cardiovascular, Gastroenterology, Genitourinary, Integumentary, Musculoskeletal, Psychiatric were all negative except for pertinent positives noted in my HPI.     PHYSICAL EXAM  VSS  Vital Signs: There were no vitals taken for this visit. Patient declined being weighed. There is no height or weight on file to calculate BMI.    General  - normal appearance, in no obvious distress  CV  - normal peripheral perfusion  Pulm  - normal respiratory pattern, non-labored  Musculoskeletal - lumbar spine  - stance: normal gait without limp, no obvious leg length discrepancy, normal heel and toe walk  - inspection: normal bone and joint alignment, no obvious scoliosis  - palpation: no paravertebral or bony tenderness  - ROM: flexion exacerbates some low back and right hip pain, normal extension, sidebending, rotation  - strength: lower extremities 5/5 in all planes  - special tests:  (+) straight leg raise- right  (+) slump test  Neuro  - patellar and Achilles DTRs 2+ bilaterally, right lower extremity sensory deficit throughout L5 distribution, grossly normal coordination, normal muscle tone  Skin  - no ecchymosis,  erythema, warmth, or induration, no obvious rash  Psych  - interactive, appropriate, normal mood and affect  Right hip: has some pain with internal and external rotation  Bilateral feet: has pes planus  ASSESSMENT & PLAN  33 yo with lumbar ddd, improving, and right hip arthritis  Consider right hip injection next visit  Pes planus: patient requests orthotics referral  Start gabapentin tid  F/u in 3 weeks  followup in 3 weeks, consider another LATOYA as well    Angel Saeed MD, CAQSM

## 2019-04-13 ASSESSMENT — PATIENT HEALTH QUESTIONNAIRE - PHQ9: SUM OF ALL RESPONSES TO PHQ QUESTIONS 1-9: 0

## 2019-04-23 NOTE — TELEPHONE ENCOUNTER
RECORDS RECEIVED FROM: Right foot pain. Referred by Dr Saeed.    DATE RECEIVED: May 6, 2019    NOTES STATUS DETAILS   OFFICE NOTE from referring provider Internal Dr. Saeed 4/12/19   OFFICE NOTE from other specialist N/A    DISCHARGE SUMMARY from hospital N/A    DISCHARGE REPORT from the ER N/A    OPERATIVE REPORT N/A    MEDICATION LIST Internal    IMPLANT RECORD/STICKER N/A    LABS     CBC/DIFF N/A    CULTURES N/A    INJECTIONS DONE IN RADIOLOGY N/A    MRI N/A    CT SCAN N/A    XRAYS (IMAGES & REPORTS) N/A    TUMOR     PATHOLOGY  Slides & report N/A

## 2019-05-06 ENCOUNTER — ANCILLARY PROCEDURE (OUTPATIENT)
Dept: GENERAL RADIOLOGY | Facility: CLINIC | Age: 34
End: 2019-05-06
Attending: PODIATRIST
Payer: COMMERCIAL

## 2019-05-06 ENCOUNTER — PRE VISIT (OUTPATIENT)
Dept: ORTHOPEDICS | Facility: CLINIC | Age: 34
End: 2019-05-06

## 2019-05-06 ENCOUNTER — OFFICE VISIT (OUTPATIENT)
Dept: ORTHOPEDICS | Facility: CLINIC | Age: 34
End: 2019-05-06
Attending: PREVENTIVE MEDICINE
Payer: COMMERCIAL

## 2019-05-06 DIAGNOSIS — M79.604 LOW BACK PAIN RADIATING TO RIGHT LEG: ICD-10-CM

## 2019-05-06 DIAGNOSIS — M79.671 RIGHT FOOT PAIN: Primary | ICD-10-CM

## 2019-05-06 DIAGNOSIS — M54.50 LOW BACK PAIN RADIATING TO RIGHT LEG: ICD-10-CM

## 2019-05-06 DIAGNOSIS — M79.671 RIGHT FOOT PAIN: ICD-10-CM

## 2019-05-06 ASSESSMENT — ENCOUNTER SYMPTOMS
SORE THROAT: 0
ALTERED TEMPERATURE REGULATION: 1
SINUS CONGESTION: 0
DECREASED APPETITE: 0
NIGHT SWEATS: 0
ALTERED TEMPERATURE REGULATION: 1
POLYPHAGIA: 0
WEIGHT GAIN: 0
POLYPHAGIA: 0
HOARSE VOICE: 0
TROUBLE SWALLOWING: 0
TROUBLE SWALLOWING: 0
HALLUCINATIONS: 0
NIGHT SWEATS: 0
SMELL DISTURBANCE: 0
WEIGHT GAIN: 0
SMELL DISTURBANCE: 0
SORE THROAT: 0
TASTE DISTURBANCE: 0
NECK MASS: 0
NECK MASS: 0
TASTE DISTURBANCE: 0
HALLUCINATIONS: 0
SINUS PAIN: 0
HOARSE VOICE: 0
POLYDIPSIA: 1
INCREASED ENERGY: 0
SINUS CONGESTION: 0
SINUS PAIN: 0
WEIGHT LOSS: 0
INCREASED ENERGY: 0
CHILLS: 1
WEIGHT LOSS: 0
POLYDIPSIA: 1
DECREASED APPETITE: 0
FATIGUE: 1
CHILLS: 1
FEVER: 0
FEVER: 0
FATIGUE: 1

## 2019-05-06 NOTE — NURSING NOTE
Reason For Visit:   Chief Complaint   Patient presents with     Consult     Pain, right foot. Orthotics. Referring: Angel Saeed        Pain Assessment  Patient Currently in Pain: Yes  Primary Pain Location: Foot(Right )        No Known Allergies        Avril Parker LPN

## 2019-05-06 NOTE — LETTER
5/6/2019       RE: Lexy Cadena  [address]  [facility] [address]     Dear Colleague,    Thank you for referring your patient, Lexy Cadena, to the [facility] at [facility]. Please see a copy of my visit note below.    Date of Service: 5/6/2019    Chief Complaint   Patient presents with     Consult     Pain, right foot. Orthotics. Referring: Angel Saeed           HPI: Lexy is a 34 year old male who presents today for further evaluation for orthotics. Relates that he has been flat footed for years. He does have pain in the feet, especially after he has been walking for an extended period of time. He also relates positional electric and burning sensation in both of her feet. He has been seeing Dr. Saeed for lumbar spine DDD. He relates that he is taking gabapentin, but mainly at night. He is here with an .    Review of Systems: No n/v/d/f/c/ns/sob/cp    PMH:   Past Medical History:   Diagnosis Date     Arthritis      TB (pulmonary tuberculosis)     HISTORY, HAS BEEN TREATED       PSxH: No past surgical history on file.    Allergies: Patient has no known allergies.    SH:   Social History     Socioeconomic History     Marital status: Single     Spouse name: Not on file     Number of children: Not on file     Years of education: Not on file     Highest education level: Not on file   Occupational History     Not on file   Social Needs     Financial resource strain: Not on file     Food insecurity:     Worry: Not on file     Inability: Not on file     Transportation needs:     Medical: Not on file     Non-medical: Not on file   Tobacco Use     Smoking status: Never Smoker     Smokeless tobacco: Never Used   Substance and Sexual Activity     Alcohol use: No     Alcohol/week: 0.0 oz     Drug use: No     Sexual activity: Not on file   Lifestyle     Physical activity:     Days per week: Not on file     Minutes per session: Not on file     Stress: Not  on file   Relationships     Social connections:     Talks on phone: Not on file     Gets together: Not on file     Attends [employer] service: Not on file     Active member of club or organization: Not on file     Attends meetings of clubs or organizations: Not on file     Relationship status: Not on file     Intimate partner violence:     Fear of current or ex partner: Not on file     Emotionally abused: Not on file     Physically abused: Not on file     Forced sexual activity: Not on file   Other Topics Concern     Parent/sibling w/ CABG, MI or angioplasty before 65F 55M? Not Asked   Social History Narrative    ** Merged History Encounter **            FH: No family history on file.    Objective:  Data Unavailable Data Unavailable Data Unavailable Data Unavailable Data Unavailable 0 lbs 0 oz    PT and DP pulses are 2/4 bilaterally. CRT is 3 seconds. Positive pedal hair.   Gross sensation is intact bilaterally.   Equinus moderate bilaterally. No pain with active or passive ROM of the ankle, MTJ, 1st ray, or halluces bilaterally. Minimal pes planus noted BL with flexor stabilizing hammertoes noted BL. Flexor stabilizing hammertoes note BL. Hallux varus noted BL with pronatory rotation as the toe contacts the ground and begins the stance phase of gait.  Nails normal bilaterally. No open lesions are noted.     right foot xrays indicated in 3 weightbearing views.    No fractures. No soft tissue abnormality. Hallux varus.       Assessment: Pes planus with hammertoes.   Hallux varus.   Lumbar DDD with radiculopathy.     Plan:  - Pt seen and evaluated.  - XRs taken and discussed with him.  - He would like a pair of orthotics. These were molded and sent to the lab.  - See again in 2 months for assessment of the orthotics.       Again, thank you for allowing me to participate in the care of your patient.      Sincerely,    Kamari Castro DPM

## 2019-05-06 NOTE — PROGRESS NOTES
Date of Service: 5/6/2019    Chief Complaint   Patient presents with     Consult     Pain, right foot. Orthotics. Referring: Angel Saeed           HPI: Lexy is a 34 year old male who presents today for further evaluation for orthotics. Relates that he has been flat footed for years. He does have pain in the feet, especially after he has been walking for an extended period of time. He also relates positional electric and burning sensation in both of her feet. He has been seeing Dr. Saeed for lumbar spine DDD. He relates that he is taking gabapentin, but mainly at night. He is here with an .    Review of Systems: No n/v/d/f/c/ns/sob/cp    PMH:   Past Medical History:   Diagnosis Date     Arthritis      TB (pulmonary tuberculosis)     HISTORY, HAS BEEN TREATED       PSxH: No past surgical history on file.    Allergies: Patient has no known allergies.    SH:   Social History     Socioeconomic History     Marital status: Single     Spouse name: Not on file     Number of children: Not on file     Years of education: Not on file     Highest education level: Not on file   Occupational History     Not on file   Social Needs     Financial resource strain: Not on file     Food insecurity:     Worry: Not on file     Inability: Not on file     Transportation needs:     Medical: Not on file     Non-medical: Not on file   Tobacco Use     Smoking status: Never Smoker     Smokeless tobacco: Never Used   Substance and Sexual Activity     Alcohol use: No     Alcohol/week: 0.0 oz     Drug use: No     Sexual activity: Not on file   Lifestyle     Physical activity:     Days per week: Not on file     Minutes per session: Not on file     Stress: Not on file   Relationships     Social connections:     Talks on phone: Not on file     Gets together: Not on file     Attends Worship service: Not on file     Active member of club or organization: Not on file     Attends meetings of clubs or organizations: Not on file      Relationship status: Not on file     Intimate partner violence:     Fear of current or ex partner: Not on file     Emotionally abused: Not on file     Physically abused: Not on file     Forced sexual activity: Not on file   Other Topics Concern     Parent/sibling w/ CABG, MI or angioplasty before 65F 55M? Not Asked   Social History Narrative    ** Merged History Encounter **            FH: No family history on file.    Objective:  Data Unavailable Data Unavailable Data Unavailable Data Unavailable Data Unavailable 0 lbs 0 oz    PT and DP pulses are 2/4 bilaterally. CRT is 3 seconds. Positive pedal hair.   Gross sensation is intact bilaterally.   Equinus moderate bilaterally. No pain with active or passive ROM of the ankle, MTJ, 1st ray, or halluces bilaterally. Minimal pes planus noted BL with flexor stabilizing hammertoes noted BL. Flexor stabilizing hammertoes note BL. Hallux varus noted BL with pronatory rotation as the toe contacts the ground and begins the stance phase of gait.  Nails normal bilaterally. No open lesions are noted.     right foot xrays indicated in 3 weightbearing views.    No fractures. No soft tissue abnormality. Hallux varus.       Assessment: Pes planus with hammertoes.   Hallux varus.   Lumbar DDD with radiculopathy.     Plan:  - Pt seen and evaluated.  - XRs taken and discussed with him.  - He would like a pair of orthotics. These were molded and sent to the lab.  - See again in 2 months for assessment of the orthotics.

## 2019-05-17 ENCOUNTER — OFFICE VISIT (OUTPATIENT)
Dept: ORTHOPEDICS | Facility: CLINIC | Age: 34
End: 2019-05-17
Payer: COMMERCIAL

## 2019-05-17 VITALS — WEIGHT: 139 LBS | BODY MASS INDEX: 21.82 KG/M2 | HEIGHT: 67 IN

## 2019-05-17 DIAGNOSIS — M54.50 LOW BACK PAIN RADIATING TO RIGHT LEG: Primary | ICD-10-CM

## 2019-05-17 DIAGNOSIS — M79.604 LOW BACK PAIN RADIATING TO RIGHT LEG: Primary | ICD-10-CM

## 2019-05-17 DIAGNOSIS — M51.16 LUMBAR DISC HERNIATION WITH RADICULOPATHY: ICD-10-CM

## 2019-05-17 ASSESSMENT — MIFFLIN-ST. JEOR: SCORE: 1519.13

## 2019-05-17 NOTE — LETTER
5/17/2019       RE: Lexy Cadena  [address]  [facility] [address]     Dear Colleague,    Thank you for referring your patient, Lexy Cadena, to the [facility] at [facility]. Please see a copy of my visit note below.    HISTORY OF PRESENT ILLNESS  Mr. Cadena is a pleasant 34 year old year old male who presents to clinic today for followup for lumbar LATOYA  He feels like he has improved pain in low back and right leg since injection  He feels better than before  He is taking gabapentin     MEDICAL HISTORY  Patient Active Problem List   Diagnosis     TB (tuberculosis)     Low back pain radiating to right leg     CARDIOVASCULAR SCREENING; LDL GOAL LESS THAN 160       Current Outpatient Medications   Medication Sig Dispense Refill     acetaminophen (ACETAMINOPHEN EXTRA STRENGTH) 500 MG tablet Take 1-2 tablets (500-1,000 mg) by mouth every 6 hours as needed for mild pain 100 tablet 1     acetaminophen (TYLENOL) 325 MG tablet Take 2 tablets (650 mg) by mouth every 4 hours as needed for mild pain 60 tablet 0     cyclobenzaprine (FLEXERIL) 10 MG tablet Take 1 tablet by mouth 3 times daily as needed for muscle spasms. 30 tablet 1     ethambutol (MYAMBUTOL) 400 MG tablet Take 3 tablets (1,200 mg) by mouth daily 90 tablet 2     gabapentin (NEURONTIN) 300 MG capsule Take 1 capsule (300 mg) by mouth 3 times daily 90 capsule 1     gabapentin (NEURONTIN) 300 MG capsule Take 1 capsule (300 mg) by mouth 3 times daily 90 capsule 3     ibuprofen (ADVIL/MOTRIN) 600 MG tablet Take 1 tablet (600 mg) by mouth every 6 hours as needed for moderate pain 30 tablet 0     loratadine (CLARITIN) 10 MG tablet Take 1 tablet (10 mg) by mouth daily 30 tablet 12     meloxicam (MOBIC) 15 MG tablet Take 1 tablet (15 mg) by mouth daily 30 tablet 1     methylPREDNISolone (MEDROL DOSEPAK) 4 MG tablet Follow package instructions 21 tablet 0     methylPREDNISolone (MEDROL) 4 MG tablet therapy pack  "Follow Package Directions 21 tablet 0     methylPREDNISolone (MEDROL) 4 MG tablet follow package directions 1 Package 0     naproxen sodium (ANAPROX) 220 MG tablet Take 1 tablet (220 mg) by mouth 2 times daily (with meals) 60 tablet 0     neomycin-polymyxin-hydrocortisone (CORTISPORIN) 3.5-60335-0 otic suspension Place 4 drops into the right ear 4 times daily 10 mL 0     rifampin (RIFADIN) 300 MG capsule Take 1 capsule (300 mg) by mouth daily 30 capsule 2     tiotropium (SPIRIVA RESPIMAT) 2.5 MCG/ACT inhalation aerosol Inhale 2 puffs into the lungs daily 12 g 3       No Known Allergies    No family history on file.    Additional medical/Social/Surgical histories reviewed in [facility] and updated as appropriate.     REVIEW OF SYSTEMS (5/17/2019)  10 point ROS of systems including Constitutional, Eyes, Respiratory, Cardiovascular, Gastroenterology, Genitourinary, Integumentary, Musculoskeletal, Psychiatric were all negative except for pertinent positives noted in my HPI.     PHYSICAL EXAM  Vitals:    05/17/19 1226   Weight: 63 kg (139 lb)   Height: 1.702 m (5' 7\")     Vital Signs: Ht 1.702 m (5' 7\")   Wt 63 kg (139 lb)   BMI 21.77 kg/m    Patient declined being weighed. Body mass index is 21.77 kg/m .    General  - normal appearance, in no obvious distress  CV  - normal peripheral perfusion  Pulm  - normal respiratory pattern, non-labored  Musculoskeletal - lumbar spine  - stance: normal gait without limp, no obvious leg length discrepancy, normal heel and toe walk  - inspection: normal bone and joint alignment, no obvious scoliosis  - palpation: no paravertebral or bony tenderness  - ROM: flexion exacerbates pain, normal extension, sidebending, rotation  - strength: lower extremities 5/5 in all planes  - special tests:  (+) straight leg raise  (+) slump test  Neuro  - patellar and Achilles DTRs 2+ bilaterally,right lower extremity sensory deficit throughout L5 distribution, grossly normal coordination, normal muscle " tone  Skin  - no ecchymosis, erythema, warmth, or induration, no obvious rash  Psych  - interactive, appropriate, normal mood and affect    ASSESSMENT & PLAN  35 yo male with lumbar disc herniation  Reviewed lumbar MRI  Ordered LATOYA    Angel Saeed MD, CAQSM

## 2019-05-17 NOTE — PROGRESS NOTES
HISTORY OF PRESENT ILLNESS  Mr. Cadena is a pleasant 34 year old year old male who presents to clinic today for followup for lumbar LATOYA  He feels like he has improved pain in low back and right leg since injection  He feels better than before  He is taking gabapentin       MEDICAL HISTORY  Patient Active Problem List   Diagnosis     TB (tuberculosis)     Low back pain radiating to right leg     CARDIOVASCULAR SCREENING; LDL GOAL LESS THAN 160       Current Outpatient Medications   Medication Sig Dispense Refill     acetaminophen (ACETAMINOPHEN EXTRA STRENGTH) 500 MG tablet Take 1-2 tablets (500-1,000 mg) by mouth every 6 hours as needed for mild pain 100 tablet 1     acetaminophen (TYLENOL) 325 MG tablet Take 2 tablets (650 mg) by mouth every 4 hours as needed for mild pain 60 tablet 0     cyclobenzaprine (FLEXERIL) 10 MG tablet Take 1 tablet by mouth 3 times daily as needed for muscle spasms. 30 tablet 1     ethambutol (MYAMBUTOL) 400 MG tablet Take 3 tablets (1,200 mg) by mouth daily 90 tablet 2     gabapentin (NEURONTIN) 300 MG capsule Take 1 capsule (300 mg) by mouth 3 times daily 90 capsule 1     gabapentin (NEURONTIN) 300 MG capsule Take 1 capsule (300 mg) by mouth 3 times daily 90 capsule 3     ibuprofen (ADVIL/MOTRIN) 600 MG tablet Take 1 tablet (600 mg) by mouth every 6 hours as needed for moderate pain 30 tablet 0     loratadine (CLARITIN) 10 MG tablet Take 1 tablet (10 mg) by mouth daily 30 tablet 12     meloxicam (MOBIC) 15 MG tablet Take 1 tablet (15 mg) by mouth daily 30 tablet 1     methylPREDNISolone (MEDROL DOSEPAK) 4 MG tablet Follow package instructions 21 tablet 0     methylPREDNISolone (MEDROL) 4 MG tablet therapy pack Follow Package Directions 21 tablet 0     methylPREDNISolone (MEDROL) 4 MG tablet follow package directions 1 Package 0     naproxen sodium (ANAPROX) 220 MG tablet Take 1 tablet (220 mg) by mouth 2 times daily (with meals) 60 tablet 0     neomycin-polymyxin-hydrocortisone  "(CORTISPORIN) 3.5-49312-5 otic suspension Place 4 drops into the right ear 4 times daily 10 mL 0     rifampin (RIFADIN) 300 MG capsule Take 1 capsule (300 mg) by mouth daily 30 capsule 2     tiotropium (SPIRIVA RESPIMAT) 2.5 MCG/ACT inhalation aerosol Inhale 2 puffs into the lungs daily 12 g 3       No Known Allergies    No family history on file.    Additional medical/Social/Surgical histories reviewed in [facility] and updated as appropriate.     REVIEW OF SYSTEMS (5/17/2019)  10 point ROS of systems including Constitutional, Eyes, Respiratory, Cardiovascular, Gastroenterology, Genitourinary, Integumentary, Musculoskeletal, Psychiatric were all negative except for pertinent positives noted in my HPI.     PHYSICAL EXAM  Vitals:    05/17/19 1226   Weight: 63 kg (139 lb)   Height: 1.702 m (5' 7\")     Vital Signs: Ht 1.702 m (5' 7\")   Wt 63 kg (139 lb)   BMI 21.77 kg/m   Patient declined being weighed. Body mass index is 21.77 kg/m .    General  - normal appearance, in no obvious distress  CV  - normal peripheral perfusion  Pulm  - normal respiratory pattern, non-labored  Musculoskeletal - lumbar spine  - stance: normal gait without limp, no obvious leg length discrepancy, normal heel and toe walk  - inspection: normal bone and joint alignment, no obvious scoliosis  - palpation: no paravertebral or bony tenderness  - ROM: flexion exacerbates pain, normal extension, sidebending, rotation  - strength: lower extremities 5/5 in all planes  - special tests:  (+) straight leg raise  (+) slump test  Neuro  - patellar and Achilles DTRs 2+ bilaterally,right lower extremity sensory deficit throughout L5 distribution, grossly normal coordination, normal muscle tone  Skin  - no ecchymosis, erythema, warmth, or induration, no obvious rash  Psych  - interactive, appropriate, normal mood and affect    ASSESSMENT & PLAN  35 yo male with lumbar disc herniation  Reviewed lumbar MRI  Ordered LATOYA      Angel Saeed MD, CAQSM  "

## 2019-05-17 NOTE — NURSING NOTE
"Reason For Visit:   Chief Complaint   Patient presents with     Lower Back - Follow Up, Pain     Follow Up     lumbar epidural injection        Ht 1.702 m (5' 7\")   Wt 63 kg (139 lb)   BMI 21.77 kg/m      Pain Assessment  Patient Currently in Pain: Yes  0-10 Pain Scale: 5  Primary Pain Location: Back(low)    Venus Pradhan, ATC    "

## 2019-06-27 NOTE — PROGRESS NOTES
Chief Complaint   Patient presents with     Follow Up     Pain, bilateral foot. R>L. Patient stated that he has not recieved his orthotics and they were suppossed to be sent to his house.           No Known Allergies      Subjective: Lexy is a 34 year old male who presents to the clinic today for a follow up of pes planus. He relates that he did not get the orthotics. Lab has called him multiple times to get the fitting scheduled.     Objective  No changes in pain. No physical;      right foot xrays indicated in 3 weightbearing views.     No fractures. No soft tissue abnormality. Hallux varus.         Assessment: Pes planus with hammertoes.   Hallux varus.   Lumbar DDD with radiculopathy.      Plan:  - Pt seen and evaluated.  - Needs to  orthotics.  - No charge for visit.   - See again 2 months.

## 2019-07-01 ENCOUNTER — OFFICE VISIT (OUTPATIENT)
Dept: ORTHOPEDICS | Facility: CLINIC | Age: 34
End: 2019-07-01
Payer: COMMERCIAL

## 2019-07-01 DIAGNOSIS — M79.671 PAIN IN BOTH FEET: ICD-10-CM

## 2019-07-01 DIAGNOSIS — M79.672 PAIN IN BOTH FEET: ICD-10-CM

## 2019-07-01 DIAGNOSIS — M21.42 PES PLANUS OF BOTH FEET: Primary | ICD-10-CM

## 2019-07-01 DIAGNOSIS — M21.41 PES PLANUS OF BOTH FEET: Primary | ICD-10-CM

## 2019-07-01 NOTE — LETTER
7/1/2019       RE: Lexy Cadena  [address]  [facility] [address]     Dear Colleague,    Thank you for referring your patient, Lexy Cadena, [facility] Please see a copy of my visit note below.    Chief Complaint   Patient presents with     Follow Up     Pain, bilateral foot. R>L. Patient stated that he has not recieved his orthotics and they were suppossed to be sent to his house.       No Known Allergies  Subjective: Lexy is a 34 year old male who presents to the clinic today for a follow up of pes planus. He relates that he did not get the orthotics. Lab has called him multiple times to get the fitting scheduled.     Objective  No changes in pain. No physical;      right foot xrays indicated in 3 weightbearing views.     No fractures. No soft tissue abnormality. Hallux varus.      Assessment: Pes planus with hammertoes.   Hallux varus.   Lumbar DDD with radiculopathy.      Plan:  - Pt seen and evaluated.  - Needs to  orthotics.  - No charge for visit.   - See again 2 months.     Again, thank you for allowing me to participate in the care of your patient.      Sincerely,    Kamari Castro DPM

## 2019-07-01 NOTE — NURSING NOTE
Reason For Visit:   Chief Complaint   Patient presents with     Follow Up     Pain, bilateral foot. R>L. Patient stated that he has not recieved his orthotics and they were suppossed to be sent to his house.        Pain Assessment  Patient Currently in Pain: Yes  Primary Pain Location: Foot(Bilateral)        No Known Allergies        Avril Parker LPN

## 2019-09-06 ENCOUNTER — OFFICE VISIT (OUTPATIENT)
Dept: ORTHOPEDICS | Facility: CLINIC | Age: 34
End: 2019-09-06
Payer: COMMERCIAL

## 2019-09-06 DIAGNOSIS — M54.50 LOW BACK PAIN RADIATING TO RIGHT LEG: ICD-10-CM

## 2019-09-06 DIAGNOSIS — M79.671 PAIN IN BOTH FEET: ICD-10-CM

## 2019-09-06 DIAGNOSIS — M21.42 PES PLANUS OF BOTH FEET: Primary | ICD-10-CM

## 2019-09-06 DIAGNOSIS — M79.672 PAIN IN BOTH FEET: ICD-10-CM

## 2019-09-06 DIAGNOSIS — M21.41 PES PLANUS OF BOTH FEET: Primary | ICD-10-CM

## 2019-09-06 DIAGNOSIS — M79.604 LOW BACK PAIN RADIATING TO RIGHT LEG: ICD-10-CM

## 2019-09-06 NOTE — LETTER
9/6/2019       RE: Lexy Cadena  [address]  [facility] [address]     Dear Colleague,    Thank you for referring your patient, Lexy Cadena, to the [facility] at [facility]. Please see a copy of my visit note below.    Chief Complaint   Patient presents with     RECHECK     2 month follow up orthotics check up - Right leg pain/numbness     No Known Allergies    Subjective: Lexy is a 34 year old male who presents to the clinic today for a follow up of BL foot pain. He did  his orthotics. Relates that he wears them but they are pushing his foot outward. He does not have these with him. Having sciatic pain that se sees Dr. Saeed for. He is here with an  today.     Objective    Equinus moderate bilaterally. No pain with active or passive ROM of the ankle, MTJ, 1st ray, or halluces bilaterally. Minimal pes planus noted BL with flexor stabilizing hammertoes noted BL. Flexor stabilizing hammertoes note BL.  Unfortunately, I cannot make any recommendations on the orthotics as he does not have them.    Assessment: Pes planus with hammertoes.   Lumbar radiculopathy.   Hallux varus.     Plan:   - Pt seen and evaluated  - He should go back to the orthotic lab with the orthotics and discuss modification.   - Reappoint with Dr. Saeed for the back pain.  - Pt to return to clinic PRN.      Again, thank you for allowing me to participate in the care of your patient.      Sincerely,    Kamari Castro DPM

## 2019-09-06 NOTE — PROGRESS NOTES
Chief Complaint   Patient presents with     RECHECK     2 month follow up orthotics check up - Right leg pain/numbness          No Known Allergies      Subjective: Lexy is a 34 year old male who presents to the clinic today for a follow up of BL foot pain. He did  his orthotics. Relates that he wears them but they are pushing his foot outward. He does not have these with him. Having sciatic pain that se sees Dr. Saeed for. He is here with an  today.     Objective    Equinus moderate bilaterally. No pain with active or passive ROM of the ankle, MTJ, 1st ray, or halluces bilaterally. Minimal pes planus noted BL with flexor stabilizing hammertoes noted BL. Flexor stabilizing hammertoes note BL.  Unfortunately, I cannot make any recommendations on the orthotics as he does not have them.    Assessment: Pes planus with hammertoes.   Lumbar radiculopathy.   Hallux varus.     Plan:   - Pt seen and evaluated  - He should go back to the orthotic lab with the orthotics and discuss modification.   - Reappoint with Dr. Saeed for the back pain.  - Pt to return to clinic PRN.

## 2019-09-06 NOTE — NURSING NOTE
Reason For Visit:   Chief Complaint   Patient presents with     RECHECK     2 month follow up orthotics check up - Right leg pain/numbness       There were no vitals taken for this visit.    Pain Assessment  Patient Currently in Pain: Yes  0-10 Pain Scale: 5  Primary Pain Location: Leg    Ayesha Pantoja ATC

## 2020-01-17 ENCOUNTER — HOSPITAL ENCOUNTER (EMERGENCY)
Facility: CLINIC | Age: 35
Discharge: HOME | End: 2020-01-17
Attending: EMERGENCY MEDICINE | Admitting: EMERGENCY MEDICINE
Payer: COMMERCIAL

## 2020-01-17 VITALS
BODY MASS INDEX: 21.46 KG/M2 | HEART RATE: 64 BPM | WEIGHT: 137 LBS | SYSTOLIC BLOOD PRESSURE: 118 MMHG | DIASTOLIC BLOOD PRESSURE: 85 MMHG | RESPIRATION RATE: 20 BRPM | OXYGEN SATURATION: 98 % | TEMPERATURE: 97.6 F
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
## 2020-01-17 PROCEDURE — 99283 EMERGENCY DEPT VISIT LOW MDM: CPT | Performed by: EMERGENCY MEDICINE

## 2020-01-17 PROCEDURE — 99283 EMERGENCY DEPT VISIT LOW MDM: CPT | Mod: Z6 | Performed by: EMERGENCY MEDICINE

## 2020-01-17 PROCEDURE — 25000132 ZZH RX MED GY IP 250 OP 250 PS 637: Performed by: EMERGENCY MEDICINE

## 2020-01-17 RX ORDER — IBUPROFEN 600 MG/1
600 TABLET, FILM COATED ORAL ONCE
Status: COMPLETED | OUTPATIENT
Start: 2020-01-17 | End: 2020-01-17

## 2020-01-17 RX ORDER — IBUPROFEN 600 MG/1
600 TABLET, FILM COATED ORAL EVERY 6 HOURS PRN
Qty: 30 TABLET | Refills: 0 | Status: SHIPPED | OUTPATIENT
Start: 2020-01-17 | End: 2021-10-28

## 2020-01-17 RX ADMIN — IBUPROFEN 600 MG: 600 TABLET ORAL at 16:48

## 2020-01-17 ASSESSMENT — ENCOUNTER SYMPTOMS
SHORTNESS OF BREATH: 0
COLOR CHANGE: 0
HEADACHES: 0
EYE REDNESS: 0
DIFFICULTY URINATING: 0
NECK STIFFNESS: 0
ARTHRALGIAS: 0
CONFUSION: 0
FEVER: 0
ABDOMINAL PAIN: 0

## 2020-01-17 NOTE — ED PROVIDER NOTES
History     Chief Complaint   Patient presents with     Fall     Pt fell backwards around 1430 today and hit head, pt c/o pain from head all way to toes, getting worse     HPI  Lexy Cadena is a 35 year old male who presents for evaluation status post fall.  Patient states that approximately 2 hours prior to presentation he slipped and fell backwards.  He is unsure if he hit his head but denies loss of consciousness.  Patient has pain from the top of his head all the way down to his toes.  He denies inability to perform usual ADLs such as walking, eating, drinking or other.    I have reviewed the Medications, Allergies, Past Medical and Surgical History, and Social History in the Epic system.    Review of Systems   Constitutional: Negative for fever.   HENT: Negative for congestion.    Eyes: Negative for redness.   Respiratory: Negative for shortness of breath.    Cardiovascular: Negative for chest pain.   Gastrointestinal: Negative for abdominal pain.   Genitourinary: Negative for difficulty urinating.   Musculoskeletal: Negative for arthralgias and neck stiffness.   Skin: Negative for color change.   Neurological: Negative for headaches.   Psychiatric/Behavioral: Negative for confusion.       Physical Exam   BP: 127/85  Pulse: 75  Resp: 12  Weight: 62.1 kg (137 lb)  SpO2: 96 %      Physical Exam  HENT:      Head: Atraumatic.   Eyes:      Pupils: Pupils are equal, round, and reactive to light.   Cardiovascular:      Rate and Rhythm: Regular rhythm.      Heart sounds: Normal heart sounds.   Pulmonary:      Effort: No respiratory distress.      Breath sounds: Normal breath sounds.   Chest:      Chest wall: No tenderness.   Abdominal:      Tenderness: There is no abdominal tenderness.   Musculoskeletal:      Cervical back: He exhibits no tenderness.      Thoracic back: He exhibits no tenderness.      Lumbar back: He exhibits no tenderness.   Neurological:      Mental Status: He is oriented to person, place, and  time.         ED Course        Procedures             Labs Ordered and Resulted from Time of ED Arrival Up to the Time of Departure from the ED - No data to display         Assessments & Plan (with Medical Decision Making)   35-year-old male presents status post fall with total body pain.  Differential is rather broad and includes fractures, contusions, dislocations.  His exam is entirely unremarkable including detailed neurologic.  I suspect musculoskeletal pain secondary to fall.  Patient will be discharged with ibuprofen.  I have reviewed the nursing notes.    I have reviewed the findings, diagnosis, plan and need for follow up with the patient.    New Prescriptions    No medications on file       Final diagnoses:   Fall, initial encounter       1/17/2020   [facility], [place], EMERGENCY DEPARTMENT     Uli Robert MD  01/17/20 [id number]

## 2020-01-17 NOTE — ED AVS SNAPSHOT
[facility], [place], Emergency Department  [address]  [facility] [address]  Phone:  [phone]  Fax:  [phone]                                    Lexy Cadena   MRN: [id number]    Department:  [facility], Emergency Department   Date of Visit:  1/17/2020           After Visit Summary Signature Page    I have received my discharge instructions, and my questions have been answered. I have discussed any challenges I see with this plan with the nurse or doctor.    ..........................................................................................................................................  Patient/Patient Representative Signature      ..........................................................................................................................................  Patient Representative Print Name and Relationship to Patient    ..................................................               ................................................  Date                                   Time    ..........................................................................................................................................  Reviewed by Signature/Title    ...................................................              ..............................................  Date                                               Time          22EPIC Rev 08/18

## 2021-10-25 ENCOUNTER — HOSPITAL ENCOUNTER (OUTPATIENT)
Dept: GENERAL RADIOLOGY | Facility: CLINIC | Age: 36
Discharge: HOME | End: 2021-10-25
Attending: REGISTERED NURSE | Admitting: REGISTERED NURSE
Payer: COMMERCIAL

## 2021-10-25 DIAGNOSIS — R76.12 POSITIVE QUANTIFERON-TB GOLD TEST: ICD-10-CM

## 2021-10-25 PROCEDURE — 71046 X-RAY EXAM CHEST 2 VIEWS: CPT | Mod: 26 | Performed by: RADIOLOGY

## 2021-10-25 PROCEDURE — 71046 X-RAY EXAM CHEST 2 VIEWS: CPT

## 2021-10-28 ENCOUNTER — HOSPITAL ENCOUNTER (EMERGENCY)
Facility: CLINIC | Age: 36
Discharge: HOME | End: 2021-10-28
Attending: EMERGENCY MEDICINE | Admitting: EMERGENCY MEDICINE
Payer: COMMERCIAL

## 2021-10-28 ENCOUNTER — APPOINTMENT (OUTPATIENT)
Dept: GENERAL RADIOLOGY | Facility: CLINIC | Age: 36
End: 2021-10-28
Attending: EMERGENCY MEDICINE
Payer: COMMERCIAL

## 2021-10-28 VITALS
OXYGEN SATURATION: 99 % | SYSTOLIC BLOOD PRESSURE: 119 MMHG | RESPIRATION RATE: 16 BRPM | HEART RATE: 58 BPM | BODY MASS INDEX: 20.99 KG/M2 | WEIGHT: 134 LBS | TEMPERATURE: 97.9 F | DIASTOLIC BLOOD PRESSURE: 79 MMHG

## 2021-10-28 DIAGNOSIS — G89.29 CHRONIC RIGHT-SIDED LOW BACK PAIN WITH RIGHT-SIDED SCIATICA: ICD-10-CM

## 2021-10-28 DIAGNOSIS — M25.562 ACUTE PAIN OF LEFT KNEE: ICD-10-CM

## 2021-10-28 DIAGNOSIS — M54.41 CHRONIC RIGHT-SIDED LOW BACK PAIN WITH RIGHT-SIDED SCIATICA: ICD-10-CM

## 2021-10-28 PROCEDURE — 99283 EMERGENCY DEPT VISIT LOW MDM: CPT

## 2021-10-28 PROCEDURE — 99283 EMERGENCY DEPT VISIT LOW MDM: CPT | Performed by: EMERGENCY MEDICINE

## 2021-10-28 PROCEDURE — 250N000013 HC RX MED GY IP 250 OP 250 PS 637: Performed by: EMERGENCY MEDICINE

## 2021-10-28 PROCEDURE — 73562 X-RAY EXAM OF KNEE 3: CPT | Mod: LT

## 2021-10-28 RX ORDER — IBUPROFEN 600 MG/1
600 TABLET, FILM COATED ORAL EVERY 6 HOURS PRN
Qty: 30 TABLET | Refills: 0 | Status: SHIPPED | OUTPATIENT
Start: 2021-10-28 | End: 2022-12-26

## 2021-10-28 RX ORDER — METHOCARBAMOL 500 MG/1
500 TABLET, FILM COATED ORAL 3 TIMES DAILY PRN
Qty: 20 TABLET | Refills: 0 | Status: SHIPPED | OUTPATIENT
Start: 2021-10-28 | End: 2022-04-08

## 2021-10-28 RX ORDER — IBUPROFEN 600 MG/1
600 TABLET, FILM COATED ORAL ONCE
Status: COMPLETED | OUTPATIENT
Start: 2021-10-28 | End: 2021-10-28

## 2021-10-28 RX ADMIN — IBUPROFEN 600 MG: 600 TABLET ORAL at 15:07

## 2021-10-28 ASSESSMENT — ENCOUNTER SYMPTOMS
NAUSEA: 0
SORE THROAT: 0
BACK PAIN: 1
DYSPHORIC MOOD: 0
HEADACHES: 0
COUGH: 0
EYE REDNESS: 0
DIFFICULTY URINATING: 0
SLEEP DISTURBANCE: 0
VOMITING: 0
NECK PAIN: 0
FEVER: 0
SHORTNESS OF BREATH: 0
ABDOMINAL PAIN: 0
WEAKNESS: 0

## 2021-10-28 NOTE — ED PROVIDER NOTES
ED Provider Note  [facility]      History     Chief Complaint   Patient presents with     Leg Pain     chronic pain to right lateral leg from foot to hip, pain to left lower extremity from foot to knee started three weeks ago, he is      HPI  Lexy Cadena is a 36 year old male with a history of low back pain with sciatica who presents to the emergency department with left knee pain. He states he has also been having his right sided radiculopathy recently as well. He denies any recent fall or injury. He denies any weakness. No bowel or bladder dysfunction. He denies any recent fever or illness. No abdominal pain. No chest pain or dyspnea. He is not currently taking any analgesics and does not recall which prior medications were helpful. He has not received any kind of physical therapy or evaluation approximately a year.       Past Medical History  Past Medical History:   Diagnosis Date     Arthritis      TB (pulmonary tuberculosis)     HISTORY, HAS BEEN TREATED     History reviewed. No pertinent surgical history.  ibuprofen (ADVIL/MOTRIN) 600 MG tablet  methocarbamol (ROBAXIN) 500 MG tablet  acetaminophen (ACETAMINOPHEN EXTRA STRENGTH) 500 MG tablet  acetaminophen (TYLENOL) 325 MG tablet  cyclobenzaprine (FLEXERIL) 10 MG tablet  ethambutol (MYAMBUTOL) 400 MG tablet  gabapentin (NEURONTIN) 300 MG capsule  loratadine (CLARITIN) 10 MG tablet  meloxicam (MOBIC) 15 MG tablet  methylPREDNISolone (MEDROL DOSEPAK) 4 MG tablet  methylPREDNISolone (MEDROL) 4 MG tablet therapy pack  methylPREDNISolone (MEDROL) 4 MG tablet  naproxen sodium (ANAPROX) 220 MG tablet  neomycin-polymyxin-hydrocortisone (CORTISPORIN) 3.5-93292-3 otic suspension  rifampin (RIFADIN) 300 MG capsule  tiotropium (SPIRIVA RESPIMAT) 2.5 MCG/ACT inhalation aerosol      No Known Allergies  Family History  History reviewed. No pertinent family history.  Social History   Social History     Tobacco Use     Smoking status:  Never Smoker     Smokeless tobacco: Never Used   Substance Use Topics     Alcohol use: No     Alcohol/week: 0.0 standard drinks     Drug use: No      Past medical history, past surgical history, medications, allergies, family history, and social history were reviewed with the patient. No additional pertinent items.       Review of Systems   Constitutional: Negative for fever.   HENT: Negative for congestion and sore throat.    Eyes: Negative for redness.   Respiratory: Negative for cough and shortness of breath.    Cardiovascular: Negative for chest pain.   Gastrointestinal: Negative for abdominal pain, nausea and vomiting.   Genitourinary: Negative for difficulty urinating.   Musculoskeletal: Positive for back pain. Negative for neck pain.        See HPI   Skin: Negative for rash.   Neurological: Negative for weakness and headaches.   Psychiatric/Behavioral: Negative for dysphoric mood, sleep disturbance and suicidal ideas.   All other systems reviewed and are negative.    A complete review of systems was performed with pertinent positives and negatives noted in the HPI, and all other systems negative.    Physical Exam   BP: 119/79  Pulse: 58  Temp: 97.9  F (36.6  C)  Resp: 16  Weight: 60.8 kg (134 lb)  SpO2: 99 %  Physical Exam  Vitals and nursing note reviewed.   Constitutional:       General: He is not in acute distress.     Appearance: Normal appearance. He is not diaphoretic.   HENT:      Head: Normocephalic and atraumatic.   Eyes:      General: No scleral icterus.     Pupils: Pupils are equal, round, and reactive to light.   Cardiovascular:      Rate and Rhythm: Normal rate and regular rhythm.      Pulses: Normal pulses.      Heart sounds: Normal heart sounds.   Pulmonary:      Effort: Pulmonary effort is normal. No respiratory distress.      Breath sounds: Normal breath sounds.   Abdominal:      General: Bowel sounds are normal.      Palpations: Abdomen is soft.      Tenderness: There is no abdominal  tenderness.   Musculoskeletal:         General: Tenderness present. Normal range of motion.      Lumbar back: No bony tenderness. Normal range of motion. Negative right straight leg raise test and negative left straight leg raise test.      Left knee: No swelling. Normal range of motion.      Right lower leg: No edema.      Left lower leg: No edema.   Skin:     General: Skin is warm.      Findings: No rash.   Neurological:      Mental Status: He is alert.         ED Course      Procedures       The medical record was reviewed and interpreted.  Current images reviewed and interpreted: Possible sclerotic lesion left lateral femoral condyle.  Results for orders placed or performed during the hospital encounter of 10/28/21   XR Knee Left 3 Views     Status: None    Narrative    XR KNEE LEFT 3 VIEWS 10/28/2021 3:30 PM     HISTORY: pain      Impression    IMPRESSION: Subchondral sclerosis in the lateral femoral condyle which  may be projectional. However, subchondral insufficiency fracture could  have this appearance. No evidence of joint effusion. No other evidence  of acute fracture.    ELI CHA MD         SYSTEM ID:  [id number]         Results for orders placed or performed during the hospital encounter of 10/28/21   XR Knee Left 3 Views     Status: None    Narrative    XR KNEE LEFT 3 VIEWS 10/28/2021 3:30 PM     HISTORY: pain      Impression    IMPRESSION: Subchondral sclerosis in the lateral femoral condyle which  may be projectional. However, subchondral insufficiency fracture could  have this appearance. No evidence of joint effusion. No other evidence  of acute fracture.    ELI CHA MD         SYSTEM ID:  [id number]     Medications   ibuprofen (ADVIL/MOTRIN) tablet 600 mg (600 mg Oral Given 10/28/21 1507)     Declined knee MRI     Assessments & Plan (with Medical Decision Making)   36 year old male with history of low back pain and right-sided sciatica to the emergency department with recurrent symptoms.  He  did have MRI imaging a year ago without significant/operative pathology found.  He did have some mild disc disease.  The patient does not have any clinical symptoms or signs of cauda equina syndrome.  He has normal strength here in the emergency department.  Additionally, the patient complains of a few days of left knee pain.  He denies specific trauma.  He does not have any gross abnormality on exam to suggest alternative etiology such as crystal arthropathy or septic arthritis.  Radiograph reveals a possible sclerotic lesion in his lateral femoral condyle that may represent a stress fracture.  MRI imaging was recommended and offered to the patient.  He declines MRI imaging today.  Will provide him crutches to help protect his knee until he gets outpatient imaging arranged and performed.  Weightbearing as tolerated recommended.  Ibuprofen for pain.  Robaxin as needed for muscle spasm.  Primary care follow-up for right knee MRI and referral as indicated based on results.    I have reviewed the nursing notes. I have reviewed the findings, diagnosis, plan and need for follow up with the patient.    Discharge Medication List as of 10/28/2021  4:37 PM      START taking these medications    Details   methocarbamol (ROBAXIN) 500 MG tablet Take 1 tablet (500 mg) by mouth 3 times daily as needed for muscle spasms, Disp-20 tablet, R-0, E-Prescribe             Final diagnoses:   Chronic right-sided low back pain with right-sided sciatica   Acute pain of left knee     Chart documentation was completed with Dragon voice-recognition software. Even though reviewed, this chart may still contain some grammatical, spelling, and word errors.     --  Sandeep Matthew Md  [facility] EMERGENCY DEPARTMENT  10/28/2021     Sandeep Matthew MD  10/29/21 7449

## 2021-10-28 NOTE — DISCHARGE INSTRUCTIONS
Take ibuprofen as needed for pain.  Take Robaxin as needed for muscle spasm.  Use crutches-weightbearing as tolerated on left knee/leg.  Follow-up with your primary care provider for MRI of your knee.    Return if weakness, difficulty with bowel or bladder control, or any other concerns.

## 2022-04-08 ENCOUNTER — OFFICE VISIT (OUTPATIENT)
Dept: FAMILY MEDICINE | Facility: CLINIC | Age: 37
End: 2022-04-08
Payer: COMMERCIAL

## 2022-04-08 VITALS
HEIGHT: 67 IN | TEMPERATURE: 97.7 F | WEIGHT: 138.2 LBS | BODY MASS INDEX: 21.69 KG/M2 | SYSTOLIC BLOOD PRESSURE: 114 MMHG | HEART RATE: 77 BPM | DIASTOLIC BLOOD PRESSURE: 77 MMHG | OXYGEN SATURATION: 99 %

## 2022-04-08 DIAGNOSIS — J45.909 MILD ASTHMA WITHOUT COMPLICATION, UNSPECIFIED WHETHER PERSISTENT: ICD-10-CM

## 2022-04-08 DIAGNOSIS — M54.2 NECK PAIN: Primary | ICD-10-CM

## 2022-04-08 PROCEDURE — 99204 OFFICE O/P NEW MOD 45 MIN: CPT | Performed by: NURSE PRACTITIONER

## 2022-04-08 RX ORDER — ALBUTEROL SULFATE 90 UG/1
2 AEROSOL, METERED RESPIRATORY (INHALATION) EVERY 6 HOURS PRN
Qty: 8 G | Refills: 1 | Status: SHIPPED | OUTPATIENT
Start: 2022-04-08

## 2022-04-08 RX ORDER — METHYLPREDNISOLONE 4 MG
TABLET, DOSE PACK ORAL
Qty: 21 TABLET | Refills: 0 | Status: SHIPPED | OUTPATIENT
Start: 2022-04-08 | End: 2022-04-22

## 2022-04-08 ASSESSMENT — ENCOUNTER SYMPTOMS
BACK PAIN: 1
NECK PAIN: 1
SORE THROAT: 0
FATIGUE: 0
COUGH: 0
SHORTNESS OF BREATH: 0
CHILLS: 0
HEADACHES: 1
FEVER: 0
NUMBNESS: 1
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
## 2022-04-15 ENCOUNTER — ANCILLARY PROCEDURE (OUTPATIENT)
Dept: CT IMAGING | Facility: CLINIC | Age: 37
End: 2022-04-15
Attending: NURSE PRACTITIONER
Payer: COMMERCIAL

## 2022-04-15 DIAGNOSIS — M54.2 NECK PAIN: ICD-10-CM

## 2022-04-15 PROCEDURE — 72125 CT NECK SPINE W/O DYE: CPT | Mod: GC | Performed by: STUDENT IN AN ORGANIZED HEALTH CARE EDUCATION/TRAINING PROGRAM

## 2022-04-22 ENCOUNTER — TELEPHONE (OUTPATIENT)
Dept: FAMILY MEDICINE | Facility: CLINIC | Age: 37
End: 2022-04-22
Payer: COMMERCIAL

## 2022-04-22 DIAGNOSIS — M54.2 NECK PAIN: ICD-10-CM

## 2022-04-22 DIAGNOSIS — J45.909 MILD ASTHMA WITHOUT COMPLICATION, UNSPECIFIED WHETHER PERSISTENT: Primary | ICD-10-CM

## 2022-04-22 PROCEDURE — 99213 OFFICE O/P EST LOW 20 MIN: CPT | Mod: 95 | Performed by: NURSE PRACTITIONER

## 2022-04-22 RX ORDER — DEXAMETHASONE 4 MG/1
2 TABLET ORAL 2 TIMES DAILY
Qty: 12 G | Refills: 0 | Status: SHIPPED | OUTPATIENT
Start: 2022-04-22

## 2022-04-22 NOTE — TELEPHONE ENCOUNTER
Telephone encounter (11 minutes). Call made with [place] , [place] [id number].            1. Mild asthma without complication, unspecified whether persistent  Patient currently using SERA 3 days/week. Advised PFTs, however, patient declined. Will add ICS (Advised that flovent needs to be taken daily, even if the patient doesn't have asthmatic symptoms.) CT of neck displayed pulmonary apical groundglass opacities and BRENDA reticulation with apical bronchiectasis. Given history of TB and asthma with recent wheezing during 4/8/22 exam, will obtain CT chest.       - fluticasone (FLOVENT HFA) 110 MCG/ACT inhaler; Inhale 2 puffs into the lungs 2 times daily  Dispense: 12 g; Refill: 0  - CT Chest w/o & w Contrast; Future    2. Neck pain  Discussed CT results. No acute fracture, traumatic subluxation, spinal canal, or neural foraminal stenosis. Patient reports that the pain is still there but improved after taking a medrol dose pack. Will refer to PT for further evaluation/treament.  - Physical Therapy Referral; Future        Follow-up 4 weeks to discuss CT chest results and asthma symptoms with addition of flovent.    All questions/concerns addressed. Patient stated understanding/agreement to plan of care.    EFRA Llamas, CNP  [facility] [employer]

## 2022-04-26 ENCOUNTER — APPOINTMENT (OUTPATIENT)
Dept: INTERPRETER SERVICES | Facility: CLINIC | Age: 37
End: 2022-04-26
Payer: COMMERCIAL

## 2022-04-30 ENCOUNTER — ANCILLARY PROCEDURE (OUTPATIENT)
Dept: CT IMAGING | Facility: CLINIC | Age: 37
End: 2022-04-30
Attending: NURSE PRACTITIONER
Payer: COMMERCIAL

## 2022-04-30 DIAGNOSIS — J45.909 MILD ASTHMA WITHOUT COMPLICATION, UNSPECIFIED WHETHER PERSISTENT: ICD-10-CM

## 2022-04-30 PROCEDURE — 71250 CT THORAX DX C-: CPT | Mod: GC | Performed by: RADIOLOGY

## 2022-05-09 ENCOUNTER — TELEPHONE (OUTPATIENT)
Dept: FAMILY MEDICINE | Facility: CLINIC | Age: 37
End: 2022-05-09
Payer: COMMERCIAL

## 2022-05-09 DIAGNOSIS — A15.9 TB (TUBERCULOSIS): ICD-10-CM

## 2022-05-09 DIAGNOSIS — J45.909 MILD ASTHMA WITHOUT COMPLICATION, UNSPECIFIED WHETHER PERSISTENT: Primary | ICD-10-CM

## 2022-05-09 NOTE — TELEPHONE ENCOUNTER
2 Telephone encounters (17 minutes and 7 minutes). Calls made with Remind interpreters, Dewayne [phone] and Dionisio [phone].    1. Mild asthma without complication, unspecified whether persistent  Patient reports that since starting flovent inhaler on 4/22/22- he has only used SERA twice. Continue flovent and will check PFTs.  - Pulmonary Function Test; Future    2. TB (tuberculosis)  Ct Chest with Chronic fibrotic changes of the bilateral upper lobes with associated bronchiectasis and lobar volume loss, left greater than right. Findings are compatible with sequelae of previous tuberculosis  Infection. Will check PFTs and consider Pulm Rehab depending on PFT values.  - Pulmonary Function Test; Future    CT also showed distension of proximal duodenum. Patient denies symptoms such as ab pain, nausea, or vomiting. Instructed to reach out if he develops any GI symptoms.    All questions/concerns addressed. Patient stated understanding/agreement to plan of care.    EFRA Llamas, CNP  [facility] [employer]

## 2022-06-13 ENCOUNTER — HOSPITAL ENCOUNTER (EMERGENCY)
Facility: CLINIC | Age: 37
Discharge: HOME | End: 2022-06-13
Attending: EMERGENCY MEDICINE | Admitting: EMERGENCY MEDICINE
Payer: COMMERCIAL

## 2022-06-13 ENCOUNTER — APPOINTMENT (OUTPATIENT)
Dept: GENERAL RADIOLOGY | Facility: CLINIC | Age: 37
End: 2022-06-13
Attending: EMERGENCY MEDICINE
Payer: COMMERCIAL

## 2022-06-13 VITALS
BODY MASS INDEX: 21.5 KG/M2 | HEIGHT: 67 IN | WEIGHT: 137 LBS | TEMPERATURE: 98 F | OXYGEN SATURATION: 99 % | RESPIRATION RATE: 16 BRPM | SYSTOLIC BLOOD PRESSURE: 139 MMHG | DIASTOLIC BLOOD PRESSURE: 80 MMHG | HEART RATE: 60 BPM

## 2022-06-13 DIAGNOSIS — Z20.822 COVID-19 RULED OUT BY LABORATORY TESTING: ICD-10-CM

## 2022-06-13 DIAGNOSIS — J20.9 ACUTE BRONCHITIS, UNSPECIFIED ORGANISM: ICD-10-CM

## 2022-06-13 LAB
FLUAV RNA SPEC QL NAA+PROBE: NEGATIVE
FLUBV RNA RESP QL NAA+PROBE: NEGATIVE
SARS-COV-2 RNA RESP QL NAA+PROBE: NEGATIVE

## 2022-06-13 PROCEDURE — 87636 SARSCOV2 & INF A&B AMP PRB: CPT | Performed by: EMERGENCY MEDICINE

## 2022-06-13 PROCEDURE — 87636 SARSCOV2 & INF A&B AMP PRB: CPT | Mod: 59 | Performed by: EMERGENCY MEDICINE

## 2022-06-13 PROCEDURE — 99284 EMERGENCY DEPT VISIT MOD MDM: CPT | Mod: CS,25 | Performed by: EMERGENCY MEDICINE

## 2022-06-13 PROCEDURE — C9803 HOPD COVID-19 SPEC COLLECT: HCPCS | Performed by: EMERGENCY MEDICINE

## 2022-06-13 PROCEDURE — 99284 EMERGENCY DEPT VISIT MOD MDM: CPT | Mod: CS | Performed by: EMERGENCY MEDICINE

## 2022-06-13 PROCEDURE — 71046 X-RAY EXAM CHEST 2 VIEWS: CPT

## 2022-06-13 RX ORDER — BENZONATATE 100 MG/1
100 CAPSULE ORAL 3 TIMES DAILY PRN
Qty: 24 CAPSULE | Refills: 0 | Status: SHIPPED | OUTPATIENT
Start: 2022-06-13 | End: 2022-12-26

## 2022-06-13 RX ORDER — DOXYCYCLINE 100 MG/1
100 CAPSULE ORAL 2 TIMES DAILY
Qty: 20 CAPSULE | Refills: 0 | Status: SHIPPED | OUTPATIENT
Start: 2022-06-13 | End: 2022-06-23

## 2022-06-13 NOTE — DISCHARGE INSTRUCTIONS
Please make an appointment to follow up with Your Primary Care Provider in 2 weeks for recheck unless symptoms completely resolve.

## 2022-06-13 NOTE — ED PROVIDER NOTES
"    [facility] EMERGENCY DEPARTMENT ([facility])       6/13/22  History     Chief Complaint   Patient presents with     Fever     Cough     HPI  Lexy Cadena is a 37 year old male with a past medical history significant for pulmonary tuberculosis who presents to the Emergency Department for evaluation of cough productive of unknown colored sputum for the last week.  Patient complains of some chest pain along the left posterior lateral chest wall as well.  Patient denies any shortness of breath.  Patient denies any fevers.    Per [facility] records, patient has received both doses of the COVID vaccine. Last dose was on 04/29/21.    Past Medical History:   Diagnosis Date     Arthritis      TB (pulmonary tuberculosis)     HISTORY, HAS BEEN TREATED       No past surgical history on file.    No family history on file.    Social History     Tobacco Use     Smoking status: Never Smoker     Smokeless tobacco: Never Used   Substance Use Topics     Alcohol use: No     Alcohol/week: 0.0 standard drinks       No current facility-administered medications for this encounter.     Current Outpatient Medications   Medication     albuterol (PROAIR HFA/PROVENTIL HFA/VENTOLIN HFA) 108 (90 Base) MCG/ACT inhaler     fluticasone (FLOVENT HFA) 110 MCG/ACT inhaler     ibuprofen (ADVIL/MOTRIN) 600 MG tablet      No Known Allergies     I have reviewed the Medications, Allergies, Past Medical and Surgical History, and Social History in the Epic system.    Review of Systems  A complete review of systems was performed with pertinent positives and negatives noted in the HPI, and all other systems negative.    Physical Exam   BP: 108/69  Pulse: 64  Temp: 97.5  F (36.4  C)  Resp: 20  Height: 170.2 cm (5' 7\")  Weight: 62.1 kg (137 lb)  SpO2: 100 %      Physical Exam  Vitals and nursing note reviewed.   Constitutional:       Appearance: He is not ill-appearing or diaphoretic.   HENT:      Head: Atraumatic.   Eyes:      Extraocular Movements: " Extraocular movements intact.      Pupils: Pupils are equal, round, and reactive to light.   Cardiovascular:      Rate and Rhythm: Regular rhythm.   Pulmonary:      Breath sounds: Normal breath sounds. No wheezing, rhonchi or rales.      Comments: Patient has chest tenderness along the chest wall of the left lateral posterior chest without crepitus or flail  Abdominal:      Palpations: Abdomen is soft.   Musculoskeletal:         General: No deformity.      Cervical back: Neck supple.   Neurological:      General: No focal deficit present.      Mental Status: He is alert.   Psychiatric:         Mood and Affect: Mood normal.         ED Course     At 10:38 AM the patient was seen and examined by , MD in family consult room in ED.        Procedures              Results for orders placed or performed during the hospital encounter of 06/13/22 (from the past 24 hour(s))   Symptomatic; Yes; 6/6/2022 Influenza A/B & SARS-CoV2 (COVID-19) Virus PCR Multiplex Nasopharyngeal    Specimen: Nasopharyngeal; Swab   Result Value Ref Range    Influenza A PCR Negative Negative    Influenza B PCR Negative Negative    SARS CoV2 PCR Negative Negative    Narrative    Testing was performed using the leora SARS-CoV-2 & Influenza A/B Assay on the leora Emma System. This test should be ordered for the detection of SARS-CoV-2 and influenza viruses in individuals who meet clinical and/or epidemiological criteria. Test performance is unknown in asymptomatic patients. This test is for in vitro diagnostic use under the FDA EUA for laboratories certified under CLIA to perform moderate and/or high complexity testing. This test has not been FDA cleared or approved. A negative result does not rule out the presence of PCR inhibitors in the specimen or target RNA in concentration below the limit of detection for the assay. If only one viral target is positive but coinfection with multiple targets is suspected, the sample should be re-tested with another  FDA cleared, approved or authorized test, if coinfection would change clinical management. [facility] are certified under the Clinical Laboratory Improvement Amendments of 1988 (CLIA-88) as  qualified to perform moderate and/or high complexity laboratory testing.   Chest XR,  PA & LAT    Narrative    XR CHEST 2 VW   6/13/2022 2:20 PM     HISTORY: Productive cough, fever, shortness of breath    COMPARISON: Chest radiograph 10/25/2021, CT 4/30/2022      Impression    IMPRESSION: Cardiomediastinal silhouette within normal limits. Stable  biapical scarring/consolidation, more pronounced on the left,  unchanged from multiple prior examinations. No new, focal airspace  disease. No pleural effusion or pneumothorax. Bones are unchanged.    TAHIRA RAINEY MD         SYSTEM ID:  [id number]     Medications - No data to display          Assessments & Plan (with Medical Decision Making)     I have reviewed the nursing notes.    At this time the patient be discharged home on the medications below to follow-up with primary care in 2 weeks for recheck.    I have reviewed the findings, diagnosis, plan and need for follow up with the patient.    New Prescriptions    BENZONATATE (TESSALON) 100 MG CAPSULE    Take 1 capsule (100 mg) by mouth 3 times daily as needed for cough    DOXYCYCLINE HYCLATE (VIBRAMYCIN) 100 MG CAPSULE    Take 1 capsule (100 mg) by mouth 2 times daily for 10 days       Final diagnoses:   Acute bronchitis, unspecified organism       Please make an appointment to follow up with Your Primary Care Provider in 2 weeks for recheck unless symptoms completely resolve.    Bradley Rodgers MD, MD    6/13/2022   Former[facility] EMERGENCY DEPARTMENT     Bradley Rodgers MD  06/13/22 [id number]

## 2022-09-17 ENCOUNTER — OFFICE VISIT (OUTPATIENT)
Dept: ORTHOPEDICS | Facility: CLINIC | Age: 37
End: 2022-09-17
Payer: COMMERCIAL

## 2022-09-17 VITALS — WEIGHT: 137 LBS | BODY MASS INDEX: 21.5 KG/M2 | HEIGHT: 67 IN

## 2022-09-17 DIAGNOSIS — M54.10 RADICULAR PAIN OF RIGHT LOWER EXTREMITY: ICD-10-CM

## 2022-09-17 DIAGNOSIS — M51.369 DDD (DEGENERATIVE DISC DISEASE), LUMBAR: ICD-10-CM

## 2022-09-17 DIAGNOSIS — R20.2 NUMBNESS AND TINGLING OF BOTH LEGS: ICD-10-CM

## 2022-09-17 DIAGNOSIS — R20.0 NUMBNESS AND TINGLING OF BOTH LEGS: ICD-10-CM

## 2022-09-17 DIAGNOSIS — M54.10 RADICULAR PAIN OF LEFT LOWER EXTREMITY: Primary | ICD-10-CM

## 2022-09-17 PROCEDURE — 99203 OFFICE O/P NEW LOW 30 MIN: CPT | Performed by: FAMILY MEDICINE

## 2022-09-17 NOTE — PROGRESS NOTES
"Today, the patient reports that his leg pain started \"a long time ago.\"  Sx have been recurrent for ten years. Denies any injuries. The pain is located from his low back down his legs to his toes. Reports numbness/tingling down his bilateral legs. Pain is worse with work, sleeping, any physical activity. He is currently taking gabapentin.  He denies any fevers. He denies any incontinence or retention of urine or stool.      Currently has rx for gabapentin, cyclobenzaprine, naproxen, meloxicam, tizanidine.      MRI 8/2020 c/w degenerative changes, most significant at the L4-5 and L5-S1 levels. There is no significant canal stenosis. There is no severe foraminal stenosis, but there is mild foraminal stenosis at the L4-5 L5-S1 levels. There is moderate facet arthropathy in the lower lumbar spine    He had a right L4-5 interlaminar lumbar epidural steroid injection in March 2019, which only helped a little bit for a short time.  Physical therapy a couple of years ago, did help somewhat at the time. Home exercises.  He was prescribed gabapentin in 2020, titrating up to 300 mg 3 times daily.  He indicates that he believes the gabapentin does help \"a small bit\" and so he continues to use it.    Normal sedimentation rate and CRP and hemoglobin 8/25/2020.  Normal urinalysis 1/16/2015.    He had TB in 2013, and a positive QuantiFERON TB test in 2021, for which he completed rifampin therapy until 04/2022.           MRI lumbar spine 08/27/2020  FINDINGS: Five lumbar-type vertebral bodies. The conus medullaris terminates at the level of the L2. Normal cord signal. No acute compression fracture or suspicious osseous lesion. Disc desiccation at L4-L5 and L5-S1 with mild posterior disc height narrowing at L5-S1. Normal alignment. Mild degenerative changes of the anterior sacroiliac joints.      Level by level:     T12-L1: No spinal canal or neural foraminal narrowing.      L1-2: No spinal canal or neural foraminal narrowing. "      L2-3: No spinal canal or neural foraminal narrowing. Mild facet arthropathy.      L3-4: Spinal canal is patent. Mild to moderate facet arthropathy. Ligamentum flavum thickening. Slight left neural foraminal narrowing.      L4-5: Minimal disc bulge with annular fissure. Moderate facet arthropathy. Ligamentum flavum thickening. Spinal canal is patent. Mild neural foraminal narrowing.      L5-S1: Mild disc bulge asymmetric to the left. Moderate facet arthropathy. Ligamentum flavum thickening. Mild lateral recess narrowing greater on the left with contact of the descending S1 nerve roots. Spinal canal is patent. Mild left and slight right neural foraminal narrowing.      IMPRESSION:   1. Degenerative findings most pronounced at L4-L5 and L5-S1, without high-grade spinal canal or neural foraminal narrowing.  2. Moderate facet arthropathy at L4-L5 with minimal disc bulge and annular fissure, with mild neural foraminal narrowing.  3. Mild disc bulge asymmetric to the left at L5-S1, with moderate facet arthropathy, mild lateral recess narrowing greater on the left, with mild left and slight right neural foraminal narrowing.  4. Mild to moderate facet arthropathy at L3-L4.  5. Degenerative changes of the sacroiliac joints.            PMH:  Past Medical History:   Diagnosis Date     Arthritis      TB (pulmonary tuberculosis)     HISTORY, HAS BEEN TREATED       Active problem list:  Patient Active Problem List   Diagnosis     TB (tuberculosis)     Low back pain radiating to right leg     CARDIOVASCULAR SCREENING; LDL GOAL LESS THAN 160       FH:  No family history on file.    SH:  Social History     Socioeconomic History     Marital status: Single     Spouse name: Not on file     Number of children: Not on file     Years of education: Not on file     Highest education level: Not on file   Occupational History     Comment: Works at [employer]   Tobacco Use     Smoking status: Never Smoker     Smokeless tobacco: Never  Used   Vaping Use     Vaping Use: Never used   Substance and Sexual Activity     Alcohol use: No     Alcohol/week: 0.0 standard drinks     Drug use: No     Sexual activity: Not Currently   Other Topics Concern     Parent/sibling w/ CABG, MI or angioplasty before 65F 55M? Not Asked   Social History Narrative    ** Merged History Encounter **          Social Determinants of Health     Financial Resource Strain: Not on file   Food Insecurity: Not on file   Transportation Needs: Not on file   Physical Activity: Not on file   Stress: Not on file   Social Connections: Not on file   Intimate Partner Violence: Not on file   Housing Stability: Not on file       MEDS:  See EMR, reviewed  ALL:  See EMR, reviewed    REVIEW OF SYSTEMS:  CONSTITUTIONAL:NEGATIVE for fever, chills, change in weight  INTEGUMENTARY/SKIN: NEGATIVE for worrisome rashes, moles or lesions  EYES: NEGATIVE for vision changes or irritation  ENT/MOUTH: NEGATIVE for ear, mouth and throat problems  RESP:NEGATIVE for significant cough or SOB  BREAST: NEGATIVE for masses, tenderness or discharge  CV: NEGATIVE for chest pain, palpitations or peripheral edema  GI: NEGATIVE for nausea, abdominal pain, heartburn, or change in bowel habits  :NEGATIVE for frequency, dysuria, or hematuria  :NEGATIVE for frequency, dysuria, or hematuria  NEURO: NEGATIVE for weakness, dizziness or paresthesias  ENDOCRINE: NEGATIVE for temperature intolerance, skin/hair changes  HEME/ALLERGY/IMMUNE: NEGATIVE for bleeding problems  PSYCHIATRIC: NEGATIVE for changes in mood or affect        Objective: He is a thin, fit appearing male.  Straight leg raise is negative bilaterally.  5 out of 5 strength bilaterally at hip, knee, ankle including toe strength and foot evertor strength.  No swelling in either lower extremity.  Peripheral pulses strong and symmetrical.  Appropriate conversation and affect.    Assessment lumbar spine degenerative disc and joint disease with a history of  recurrent radicular leg pain.    Plan: MRI of the lumbar spine is pending to evaluate for significant impingement or stenosis.  Follow-up after the MRI to discuss options.  Pt recently went to the ER at [facility] for his chronic recurrent low back pain, where pt requested that he be referred to a back specialist at [facility].

## 2022-09-17 NOTE — PATIENT INSTRUCTIONS
-Call [phone] to schedule your MRI   -Then after MRI is scheduled call [phone] to schedule Follow-up with Dr. Cardona to review MRI results

## 2022-09-17 NOTE — LETTER
"  9/17/2022      RE: Lexy Cadena  [address]   [address]  [place] [address]     Dear Colleague,    Thank you for referring your patient, Lexy Cadena, to the [facility] [place]. Please see a copy of my visit note below.    Today, the patient reports that his leg pain started \"a long time ago.\"  Sx have been recurrent for ten years. Denies any injuries. The pain is located from his low back down his legs to his toes. Reports numbness/tingling down his bilateral legs. Pain is worse with work, sleeping, any physical activity. He is currently taking gabapentin.  He denies any fevers. He denies any incontinence or retention of urine or stool.      Currently has rx for gabapentin, cyclobenzaprine, naproxen, meloxicam, tizanidine.      MRI 8/2020 c/w degenerative changes, most significant at the L4-5 and L5-S1 levels. There is no significant canal stenosis. There is no severe foraminal stenosis, but there is mild foraminal stenosis at the L4-5 L5-S1 levels. There is moderate facet arthropathy in the lower lumbar spine    He had a right L4-5 interlaminar lumbar epidural steroid injection in March 2019, which only helped a little bit for a short time.  Physical therapy a couple of years ago, did help somewhat at the time. Home exercises.  He was prescribed gabapentin in 2020, titrating up to 300 mg 3 times daily.  He indicates that he believes the gabapentin does help \"a small bit\" and so he continues to use it.    Normal sedimentation rate and CRP and hemoglobin 8/25/2020.  Normal urinalysis 1/16/2015.    He had TB in 2013, and a positive QuantiFERON TB test in 2021, for which he completed rifampin therapy until 04/2022.           MRI lumbar spine 08/27/2020  FINDINGS: Five lumbar-type vertebral bodies. The conus medullaris terminates at the level of the L2. Normal cord signal. No acute compression fracture or suspicious osseous lesion. Disc desiccation at L4-L5 and L5-S1 with mild " posterior disc height narrowing at L5-S1. Normal alignment. Mild degenerative changes of the anterior sacroiliac joints.      Level by level:     T12-L1: No spinal canal or neural foraminal narrowing.      L1-2: No spinal canal or neural foraminal narrowing.      L2-3: No spinal canal or neural foraminal narrowing. Mild facet arthropathy.      L3-4: Spinal canal is patent. Mild to moderate facet arthropathy. Ligamentum flavum thickening. Slight left neural foraminal narrowing.      L4-5: Minimal disc bulge with annular fissure. Moderate facet arthropathy. Ligamentum flavum thickening. Spinal canal is patent. Mild neural foraminal narrowing.      L5-S1: Mild disc bulge asymmetric to the left. Moderate facet arthropathy. Ligamentum flavum thickening. Mild lateral recess narrowing greater on the left with contact of the descending S1 nerve roots. Spinal canal is patent. Mild left and slight right neural foraminal narrowing.      IMPRESSION:   1. Degenerative findings most pronounced at L4-L5 and L5-S1, without high-grade spinal canal or neural foraminal narrowing.  2. Moderate facet arthropathy at L4-L5 with minimal disc bulge and annular fissure, with mild neural foraminal narrowing.  3. Mild disc bulge asymmetric to the left at L5-S1, with moderate facet arthropathy, mild lateral recess narrowing greater on the left, with mild left and slight right neural foraminal narrowing.  4. Mild to moderate facet arthropathy at L3-L4.  5. Degenerative changes of the sacroiliac joints.            PMH:  Past Medical History:   Diagnosis Date     Arthritis      TB (pulmonary tuberculosis)     HISTORY, HAS BEEN TREATED       Active problem list:  Patient Active Problem List   Diagnosis     TB (tuberculosis)     Low back pain radiating to right leg     CARDIOVASCULAR SCREENING; LDL GOAL LESS THAN 160       FH:  No family history on file.    SH:  Social History     Socioeconomic History     Marital status: Single     Spouse name: Not  on file     Number of children: Not on file     Years of education: Not on file     Highest education level: Not on file   Occupational History     Comment: Works at [employer]   Tobacco Use     Smoking status: Never Smoker     Smokeless tobacco: Never Used   Vaping Use     Vaping Use: Never used   Substance and Sexual Activity     Alcohol use: No     Alcohol/week: 0.0 standard drinks     Drug use: No     Sexual activity: Not Currently   Other Topics Concern     Parent/sibling w/ CABG, MI or angioplasty before 65F 55M? Not Asked   Social History Narrative    ** Merged History Encounter **          Social Determinants of Health     Financial Resource Strain: Not on file   Food Insecurity: Not on file   Transportation Needs: Not on file   Physical Activity: Not on file   Stress: Not on file   Social Connections: Not on file   Intimate Partner Violence: Not on file   Housing Stability: Not on file       MEDS:  See EMR, reviewed  ALL:  See EMR, reviewed    REVIEW OF SYSTEMS:  CONSTITUTIONAL:NEGATIVE for fever, chills, change in weight  INTEGUMENTARY/SKIN: NEGATIVE for worrisome rashes, moles or lesions  EYES: NEGATIVE for vision changes or irritation  ENT/MOUTH: NEGATIVE for ear, mouth and throat problems  RESP:NEGATIVE for significant cough or SOB  BREAST: NEGATIVE for masses, tenderness or discharge  CV: NEGATIVE for chest pain, palpitations or peripheral edema  GI: NEGATIVE for nausea, abdominal pain, heartburn, or change in bowel habits  :NEGATIVE for frequency, dysuria, or hematuria  :NEGATIVE for frequency, dysuria, or hematuria  NEURO: NEGATIVE for weakness, dizziness or paresthesias  ENDOCRINE: NEGATIVE for temperature intolerance, skin/hair changes  HEME/ALLERGY/IMMUNE: NEGATIVE for bleeding problems  PSYCHIATRIC: NEGATIVE for changes in mood or affect        Objective: He is a thin, fit appearing male.  Straight leg raise is negative bilaterally.  5 out of 5 strength bilaterally at hip, knee, ankle  including toe strength and foot evertor strength.  No swelling in either lower extremity.  Peripheral pulses strong and symmetrical.  Appropriate conversation and affect.    Assessment lumbar spine degenerative disc and joint disease with a history of recurrent radicular leg pain.    Plan: MRI of the lumbar spine is pending to evaluate for significant impingement or stenosis.  Follow-up after the MRI to discuss options.  Pt recently went to the ER at [facility] for his chronic recurrent low back pain, where pt requested that he be referred to a back specialist at [facility]      Again, thank you for allowing me to participate in the care of your patient.      Sincerely,    Sabas Cardona MD

## 2022-10-11 ENCOUNTER — ANCILLARY PROCEDURE (OUTPATIENT)
Dept: MRI IMAGING | Facility: CLINIC | Age: 37
End: 2022-10-11
Attending: FAMILY MEDICINE
Payer: COMMERCIAL

## 2022-10-11 DIAGNOSIS — M54.10 RADICULAR PAIN OF LEFT LOWER EXTREMITY: ICD-10-CM

## 2022-10-11 DIAGNOSIS — M51.369 DDD (DEGENERATIVE DISC DISEASE), LUMBAR: ICD-10-CM

## 2022-10-11 DIAGNOSIS — M54.10 RADICULAR PAIN OF RIGHT LOWER EXTREMITY: ICD-10-CM

## 2022-10-11 DIAGNOSIS — R20.0 NUMBNESS AND TINGLING OF BOTH LEGS: ICD-10-CM

## 2022-10-11 DIAGNOSIS — R20.2 NUMBNESS AND TINGLING OF BOTH LEGS: ICD-10-CM

## 2022-10-11 PROCEDURE — 72148 MRI LUMBAR SPINE W/O DYE: CPT | Mod: GC | Performed by: RADIOLOGY

## 2022-12-26 ENCOUNTER — HOSPITAL ENCOUNTER (EMERGENCY)
Facility: CLINIC | Age: 37
Discharge: HOME | End: 2022-12-26
Attending: EMERGENCY MEDICINE | Admitting: EMERGENCY MEDICINE
Payer: COMMERCIAL

## 2022-12-26 ENCOUNTER — APPOINTMENT (OUTPATIENT)
Dept: GENERAL RADIOLOGY | Facility: CLINIC | Age: 37
End: 2022-12-26
Attending: EMERGENCY MEDICINE
Payer: COMMERCIAL

## 2022-12-26 VITALS
HEIGHT: 67 IN | BODY MASS INDEX: 21.47 KG/M2 | RESPIRATION RATE: 18 BRPM | HEART RATE: 63 BPM | TEMPERATURE: 97.7 F | WEIGHT: 136.8 LBS | DIASTOLIC BLOOD PRESSURE: 81 MMHG | OXYGEN SATURATION: 100 % | SYSTOLIC BLOOD PRESSURE: 112 MMHG

## 2022-12-26 DIAGNOSIS — S46.912A SHOULDER STRAIN, LEFT, INITIAL ENCOUNTER: ICD-10-CM

## 2022-12-26 PROCEDURE — 99283 EMERGENCY DEPT VISIT LOW MDM: CPT | Performed by: EMERGENCY MEDICINE

## 2022-12-26 PROCEDURE — 73030 X-RAY EXAM OF SHOULDER: CPT | Mod: LT

## 2022-12-26 RX ORDER — IBUPROFEN 200 MG
600 TABLET ORAL 3 TIMES DAILY
Qty: 45 TABLET | Refills: 0 | Status: SHIPPED | OUTPATIENT
Start: 2022-12-26 | End: 2022-12-31

## 2022-12-26 ASSESSMENT — ACTIVITIES OF DAILY LIVING (ADL): ADLS_ACUITY_SCORE: 35

## 2022-12-26 NOTE — ED PROVIDER NOTES
"    [facility] EMERGENCY DEPARTMENT ([facility])    12/26/22      ED PROVIDER NOTE   ED 4  History     Chief Complaint   Patient presents with     Fall     Fell on sidewalk @ 0500 today injuring his left shoulder     The history is provided by the patient and medical records.     Lexy Cadena is a 37 year old [place] male who through an  states he was trying to remove snow from around his car and fell onto his left shoulder this morning.  Patient complains of pain over the superior aspect of the left scapula and denies any head or neck injury.  The patient presents here to the ER for evaluation.  There is no other injury.    This part of the document was transcribed by Saritha Rubalcava, Medical Scribe.      Past Medical History  Past Medical History:   Diagnosis Date     Arthritis      TB (pulmonary tuberculosis)     HISTORY, HAS BEEN TREATED     History reviewed. No pertinent surgical history.     ibuprofen (ADVIL/MOTRIN) 600 MG tablet  albuterol (PROAIR HFA/PROVENTIL HFA/VENTOLIN HFA) 108 (90 Base) MCG/ACT inhaler  fluticasone (FLOVENT HFA) 110 MCG/ACT inhaler      No Known Allergies     Family History  No family history on file.     Social History   Social History     Tobacco Use     Smoking status: Never     Smokeless tobacco: Never   Vaping Use     Vaping Use: Never used   Substance Use Topics     Alcohol use: No     Alcohol/week: 0.0 standard drinks     Drug use: No      Past medical history, past surgical history, medications, allergies, family history, and social history were reviewed with the patient. No additional pertinent items.       Review of Systems  A complete review of systems was performed with pertinent positives and negatives noted in the HPI, and all other systems negative.    Physical Exam   BP: 115/74  Pulse: 63  Temp: 97.7  F (36.5  C)  Resp: 20  Height: 170.2 cm (5' 7\")  Weight: 62.1 kg (136 lb 12.8 oz)  SpO2: 99 %  Physical Exam  Vitals and nursing note reviewed.   HENT:      " Head: Atraumatic.   Eyes:      Pupils: Pupils are equal, round, and reactive to light.   Chest:      Chest wall: No tenderness.   Musculoskeletal:      Cervical back: Neck supple. No tenderness.      Comments: There is no tenderness of the glenohumeral joint of the left shoulder and the tenderness is of the superior ridge of the left scapula.  There is no tenderness of the clavicle or AC joint    Distal CMS intact   Neurological:      General: No focal deficit present.      Mental Status: He is alert and oriented to person, place, and time.   Psychiatric:         Mood and Affect: Mood normal.         ED Course      Procedures          Results for orders placed or performed during the hospital encounter of 12/26/22   XR Shoulder Left G/E 3 Views     Status: None    Narrative    XR SHOULDER LEFT G/E 3 VIEWS  12/26/2022 9:18 AM     HISTORY: trauma, pain  COMPARISON: None      Impression    IMPRESSION: No acute fracture or malalignment. There is normal joint  spacing.     TAHIRA LOFTON MD         SYSTEM ID:  [id number]     Medications - No data to display     Assessments & Plan (with Medical Decision Making)     I have reviewed the nursing notes. I have reviewed the findings, diagnosis, plan and need for follow up with the patient.    New Prescriptions    IBUPROFEN (ADVIL/MOTRIN) 200 MG TABLET    Take 3 tablets (600 mg) by mouth 3 times daily for 5 days       Final diagnoses:   Shoulder strain, left, initial encounter     Your x-ray was negative for any bony injury.    Wear sling on your left arm for comfort for up to 3 days.    Please make an appointment to follow up with Your Primary Care Provider in 4-5 days if not improving.      --  Bradley Rodgers MD  [facility] EMERGENCY DEPARTMENT  12/26/2022     Bradley Rodgers MD  12/26/22 [id number]

## 2022-12-26 NOTE — DISCHARGE INSTRUCTIONS
Your x-ray was negative for any bony injury.    Wear sling on your left arm for comfort for up to 3 days.    Please make an appointment to follow up with Your Primary Care Provider in 4-5 days if not improving.

## 2024-04-16 ENCOUNTER — HOSPITAL ENCOUNTER (EMERGENCY)
Facility: CLINIC | Age: 39
Discharge: HOME | End: 2024-04-16
Attending: EMERGENCY MEDICINE | Admitting: EMERGENCY MEDICINE
Payer: MEDICAID

## 2024-04-16 ENCOUNTER — APPOINTMENT (OUTPATIENT)
Dept: GENERAL RADIOLOGY | Facility: CLINIC | Age: 39
End: 2024-04-16
Attending: EMERGENCY MEDICINE
Payer: MEDICAID

## 2024-04-16 VITALS
HEART RATE: 82 BPM | TEMPERATURE: 98.6 F | DIASTOLIC BLOOD PRESSURE: 78 MMHG | SYSTOLIC BLOOD PRESSURE: 109 MMHG | RESPIRATION RATE: 18 BRPM | OXYGEN SATURATION: 100 %

## 2024-04-16 DIAGNOSIS — R05.1 ACUTE COUGH: ICD-10-CM

## 2024-04-16 DIAGNOSIS — R07.9 CHEST PAIN, UNSPECIFIED TYPE: ICD-10-CM

## 2024-04-16 LAB
ALBUMIN SERPL BCG-MCNC: 4.1 G/DL (ref 3.5–5.2)
ALP SERPL-CCNC: 91 U/L (ref 40–150)
ALT SERPL W P-5'-P-CCNC: 33 U/L (ref 0–70)
ANION GAP SERPL CALCULATED.3IONS-SCNC: 7 MMOL/L (ref 7–15)
AST SERPL W P-5'-P-CCNC: 29 U/L (ref 0–45)
ATRIAL RATE - MUSE: 58 BPM
BASOPHILS # BLD AUTO: 0 10E3/UL (ref 0–0.2)
BASOPHILS NFR BLD AUTO: 1 %
BILIRUB SERPL-MCNC: 0.2 MG/DL
BUN SERPL-MCNC: 7.3 MG/DL (ref 6–20)
CALCIUM SERPL-MCNC: 8.9 MG/DL (ref 8.6–10)
CHLORIDE SERPL-SCNC: 105 MMOL/L (ref 98–107)
CREAT SERPL-MCNC: 0.65 MG/DL (ref 0.67–1.17)
DEPRECATED HCO3 PLAS-SCNC: 28 MMOL/L (ref 22–29)
DIASTOLIC BLOOD PRESSURE - MUSE: NORMAL MMHG
EGFRCR SERPLBLD CKD-EPI 2021: >90 ML/MIN/1.73M2
EOSINOPHIL # BLD AUTO: 0.2 10E3/UL (ref 0–0.7)
EOSINOPHIL NFR BLD AUTO: 5 %
ERYTHROCYTE [DISTWIDTH] IN BLOOD BY AUTOMATED COUNT: 12.4 % (ref 10–15)
FLUAV RNA SPEC QL NAA+PROBE: NEGATIVE
FLUBV RNA RESP QL NAA+PROBE: NEGATIVE
GLUCOSE SERPL-MCNC: 96 MG/DL (ref 70–99)
HCT VFR BLD AUTO: 38.1 % (ref 40–53)
HGB BLD-MCNC: 12.9 G/DL (ref 13.3–17.7)
IMM GRANULOCYTES # BLD: 0 10E3/UL
IMM GRANULOCYTES NFR BLD: 0 %
INTERPRETATION ECG - MUSE: NORMAL
LACTATE SERPL-SCNC: 0.6 MMOL/L (ref 0.7–2)
LIPASE SERPL-CCNC: 14 U/L (ref 13–60)
LYMPHOCYTES # BLD AUTO: 1.3 10E3/UL (ref 0.8–5.3)
LYMPHOCYTES NFR BLD AUTO: 28 %
MAGNESIUM SERPL-MCNC: 2 MG/DL (ref 1.7–2.3)
MCH RBC QN AUTO: 27.6 PG (ref 26.5–33)
MCHC RBC AUTO-ENTMCNC: 33.9 G/DL (ref 31.5–36.5)
MCV RBC AUTO: 81 FL (ref 78–100)
MONOCYTES # BLD AUTO: 0.5 10E3/UL (ref 0–1.3)
MONOCYTES NFR BLD AUTO: 10 %
NEUTROPHILS # BLD AUTO: 2.6 10E3/UL (ref 1.6–8.3)
NEUTROPHILS NFR BLD AUTO: 56 %
NRBC # BLD AUTO: 0 10E3/UL
NRBC BLD AUTO-RTO: 0 /100
NT-PROBNP SERPL-MCNC: 57 PG/ML (ref 0–450)
P AXIS - MUSE: 69 DEGREES
PLATELET # BLD AUTO: 178 10E3/UL (ref 150–450)
POTASSIUM SERPL-SCNC: 3.6 MMOL/L (ref 3.4–5.3)
PR INTERVAL - MUSE: 146 MS
PROT SERPL-MCNC: 6.8 G/DL (ref 6.4–8.3)
QRS DURATION - MUSE: 90 MS
QT - MUSE: 410 MS
QTC - MUSE: 402 MS
R AXIS - MUSE: 69 DEGREES
RBC # BLD AUTO: 4.68 10E6/UL (ref 4.4–5.9)
RSV RNA SPEC NAA+PROBE: NEGATIVE
SARS-COV-2 RNA RESP QL NAA+PROBE: NEGATIVE
SODIUM SERPL-SCNC: 140 MMOL/L (ref 135–145)
SYSTOLIC BLOOD PRESSURE - MUSE: NORMAL MMHG
T AXIS - MUSE: 68 DEGREES
T4 FREE SERPL-MCNC: 1.3 NG/DL (ref 0.9–1.7)
TROPONIN T SERPL HS-MCNC: <6 NG/L
TSH SERPL DL<=0.005 MIU/L-ACNC: 7.23 UIU/ML (ref 0.3–4.2)
VENTRICULAR RATE- MUSE: 58 BPM
WBC # BLD AUTO: 4.6 10E3/UL (ref 4–11)

## 2024-04-16 PROCEDURE — 85025 COMPLETE CBC W/AUTO DIFF WBC: CPT | Performed by: EMERGENCY MEDICINE

## 2024-04-16 PROCEDURE — 94640 AIRWAY INHALATION TREATMENT: CPT | Performed by: EMERGENCY MEDICINE

## 2024-04-16 PROCEDURE — 250N000011 HC RX IP 250 OP 636: Performed by: EMERGENCY MEDICINE

## 2024-04-16 PROCEDURE — 83735 ASSAY OF MAGNESIUM: CPT | Performed by: EMERGENCY MEDICINE

## 2024-04-16 PROCEDURE — 99285 EMERGENCY DEPT VISIT HI MDM: CPT | Mod: 25 | Performed by: EMERGENCY MEDICINE

## 2024-04-16 PROCEDURE — 87637 SARSCOV2&INF A&B&RSV AMP PRB: CPT | Performed by: EMERGENCY MEDICINE

## 2024-04-16 PROCEDURE — 96374 THER/PROPH/DIAG INJ IV PUSH: CPT | Performed by: EMERGENCY MEDICINE

## 2024-04-16 PROCEDURE — 93005 ELECTROCARDIOGRAM TRACING: CPT | Performed by: EMERGENCY MEDICINE

## 2024-04-16 PROCEDURE — 71046 X-RAY EXAM CHEST 2 VIEWS: CPT

## 2024-04-16 PROCEDURE — 83690 ASSAY OF LIPASE: CPT | Performed by: EMERGENCY MEDICINE

## 2024-04-16 PROCEDURE — 250N000013 HC RX MED GY IP 250 OP 250 PS 637: Performed by: EMERGENCY MEDICINE

## 2024-04-16 PROCEDURE — 258N000003 HC RX IP 258 OP 636: Performed by: EMERGENCY MEDICINE

## 2024-04-16 PROCEDURE — 83880 ASSAY OF NATRIURETIC PEPTIDE: CPT | Performed by: EMERGENCY MEDICINE

## 2024-04-16 PROCEDURE — 84439 ASSAY OF FREE THYROXINE: CPT | Performed by: EMERGENCY MEDICINE

## 2024-04-16 PROCEDURE — 250N000009 HC RX 250: Performed by: EMERGENCY MEDICINE

## 2024-04-16 PROCEDURE — 36415 COLL VENOUS BLD VENIPUNCTURE: CPT | Performed by: EMERGENCY MEDICINE

## 2024-04-16 PROCEDURE — 84443 ASSAY THYROID STIM HORMONE: CPT | Performed by: EMERGENCY MEDICINE

## 2024-04-16 PROCEDURE — 93010 ELECTROCARDIOGRAM REPORT: CPT | Performed by: EMERGENCY MEDICINE

## 2024-04-16 PROCEDURE — 99284 EMERGENCY DEPT VISIT MOD MDM: CPT | Mod: 25 | Performed by: EMERGENCY MEDICINE

## 2024-04-16 PROCEDURE — 80053 COMPREHEN METABOLIC PANEL: CPT | Performed by: EMERGENCY MEDICINE

## 2024-04-16 PROCEDURE — 84484 ASSAY OF TROPONIN QUANT: CPT | Performed by: EMERGENCY MEDICINE

## 2024-04-16 PROCEDURE — 96361 HYDRATE IV INFUSION ADD-ON: CPT | Performed by: EMERGENCY MEDICINE

## 2024-04-16 PROCEDURE — 83605 ASSAY OF LACTIC ACID: CPT | Performed by: EMERGENCY MEDICINE

## 2024-04-16 RX ORDER — ACETAMINOPHEN 500 MG
1000 TABLET ORAL ONCE
Status: COMPLETED | OUTPATIENT
Start: 2024-04-16 | End: 2024-04-16

## 2024-04-16 RX ORDER — IPRATROPIUM BROMIDE AND ALBUTEROL SULFATE 2.5; .5 MG/3ML; MG/3ML
3 SOLUTION RESPIRATORY (INHALATION) ONCE
Status: COMPLETED | OUTPATIENT
Start: 2024-04-16 | End: 2024-04-16

## 2024-04-16 RX ORDER — KETOROLAC TROMETHAMINE 15 MG/ML
10 INJECTION, SOLUTION INTRAMUSCULAR; INTRAVENOUS ONCE
Status: COMPLETED | OUTPATIENT
Start: 2024-04-16 | End: 2024-04-16

## 2024-04-16 RX ADMIN — IPRATROPIUM BROMIDE AND ALBUTEROL SULFATE 3 ML: .5; 3 SOLUTION RESPIRATORY (INHALATION) at 22:14

## 2024-04-16 RX ADMIN — SODIUM CHLORIDE, POTASSIUM CHLORIDE, SODIUM LACTATE AND CALCIUM CHLORIDE 1000 ML: 600; 310; 30; 20 INJECTION, SOLUTION INTRAVENOUS at 22:13

## 2024-04-16 RX ADMIN — ACETAMINOPHEN 1000 MG: 500 TABLET ORAL at 22:14

## 2024-04-16 RX ADMIN — KETOROLAC TROMETHAMINE 10 MG: 15 INJECTION, SOLUTION INTRAMUSCULAR; INTRAVENOUS at 22:13

## 2024-04-16 ASSESSMENT — COLUMBIA-SUICIDE SEVERITY RATING SCALE - C-SSRS
2. HAVE YOU ACTUALLY HAD ANY THOUGHTS OF KILLING YOURSELF IN THE PAST MONTH?: NO
1. IN THE PAST MONTH, HAVE YOU WISHED YOU WERE DEAD OR WISHED YOU COULD GO TO SLEEP AND NOT WAKE UP?: NO
6. HAVE YOU EVER DONE ANYTHING, STARTED TO DO ANYTHING, OR PREPARED TO DO ANYTHING TO END YOUR LIFE?: NO

## 2024-04-16 ASSESSMENT — ACTIVITIES OF DAILY LIVING (ADL)
ADLS_ACUITY_SCORE: 35
ADLS_ACUITY_SCORE: 33
ADLS_ACUITY_SCORE: 33
ADLS_ACUITY_SCORE: 35

## 2024-04-17 NOTE — DISCHARGE INSTRUCTIONS
For pain, please take 975-1000mg acetaminophen (tylenol) every 8 hours -- do not take more than 3000mg in a 24 hour period.    You can take 500mg naproxen (aleve) every 12 hours for pain  Please call today to schedule a follow-up appointment with your primary medical doctor in 3-5 days for reevaluation, which is important after today's emergency department visit.   4. Please return to emergency department for fever>104F, persistent vomiting, worsening chest pain, shortness of breath

## 2024-04-17 NOTE — ED PROVIDER NOTES
[facility] EMERGENCY DEPARTMENT ([facility])    4/16/24      ED PROVIDER NOTE    History     Chief Complaint   Patient presents with    Chest Pain     Reports pain in upper sternal area    Hemoptysis     Reports coughing a lot and sometimes it is brown in color and sometimes light blood and other times clots; reports chest feels congested and subjective fever    Shortness of Breath     Hx of asthma; not using inhalers currently as none are available     HPI  Lexy Cadena is a 39 year old male with history of treated tuberculosis in the past, on no daily medications now, [place]dean speaking ( used), presents with 10 days of cough, throat pain, and chest pain. Unclear whether fever has been measured. No vomiting diarrhea or abdominal pain.    Physical Exam   BP: 133/80  Pulse: 75  Temp: 98.6  F (37  C)  Resp: 18  SpO2: 99 %  Physical Exam  patient breathing comfortably   nontoxic-appearing   posterior pharynx with cobblestoning   uvula midline   throat clear   no hoarse voice   breathing with scattered wheezes bilaterally with good aeration   heart regular rate and rhythm   abdomen soft nontender nondistended     ED Course, Procedures, & Data      Procedures            EKG Interpretation:      Interpreted by Fermin Thompson MD  Time reviewed: 1940  Symptoms at time of EKG: chest pain   Rhythm: normal sinus   Rate: 50 bpm  Axis: normal  Intervals: normal  ST Segments/ T Waves: No ST elevations or depressions  Clinical Impression: nonischemic EKG and normal sinus rhythm          Results for orders placed or performed during the hospital encounter of 04/16/24   XR Chest 2 Views     Status: None    Narrative    EXAM: XR CHEST 2 VIEWS  LOCATION: [facility]  DATE: 4/16/2024    INDICATION: cough, chest pain  COMPARISON: 06/13/2022      Impression    IMPRESSION: No change. Heart size normal. Severe scarring and volume loss involving left upper lobe with superior  retraction of right hilum and prominent right apical pleural thickening. There is mild scarring and volume loss involving right upper lobe   which is stable. Lower lung zones clear, no new process.   Comprehensive metabolic panel     Status: Abnormal   Result Value Ref Range    Sodium 140 135 - 145 mmol/L    Potassium 3.6 3.4 - 5.3 mmol/L    Carbon Dioxide (CO2) 28 22 - 29 mmol/L    Anion Gap 7 7 - 15 mmol/L    Urea Nitrogen 7.3 6.0 - 20.0 mg/dL    Creatinine 0.65 (L) 0.67 - 1.17 mg/dL    GFR Estimate >90 >60 mL/min/1.73m2    Calcium 8.9 8.6 - 10.0 mg/dL    Chloride 105 98 - 107 mmol/L    Glucose 96 70 - 99 mg/dL    Alkaline Phosphatase 91 40 - 150 U/L    AST 29 0 - 45 U/L    ALT 33 0 - 70 U/L    Protein Total 6.8 6.4 - 8.3 g/dL    Albumin 4.1 3.5 - 5.2 g/dL    Bilirubin Total 0.2 <=1.2 mg/dL   Lipase     Status: Normal   Result Value Ref Range    Lipase 14 13 - 60 U/L   Lactic acid whole blood     Status: Abnormal   Result Value Ref Range    Lactic Acid 0.6 (L) 0.7 - 2.0 mmol/L   Troponin T, High Sensitivity     Status: Normal   Result Value Ref Range    Troponin T, High Sensitivity <6 <=22 ng/L   Magnesium     Status: Normal   Result Value Ref Range    Magnesium 2.0 1.7 - 2.3 mg/dL   TSH with free T4 reflex     Status: Abnormal   Result Value Ref Range    TSH 7.23 (H) 0.30 - 4.20 uIU/mL   Nt probnp inpatient (BNP)     Status: Normal   Result Value Ref Range    N terminal Pro BNP Inpatient 57 0 - 450 pg/mL   Symptomatic Influenza A/B, RSV, & SARS-CoV2 PCR (COVID-19) Nasopharyngeal     Status: Normal    Specimen: Nasopharyngeal; Swab   Result Value Ref Range    Influenza A PCR Negative Negative    Influenza B PCR Negative Negative    RSV PCR Negative Negative    SARS CoV2 PCR Negative Negative    Narrative    Testing was performed using the Xpert Xpress CoV2/Flu/RSV Assay on the [employer] GeneXpert Instrument. This test should be ordered for the detection of SARS-CoV-2, influenza, and RSV viruses in individuals who  meet clinical and/or epidemiological criteria. Test performance is unknown in asymptomatic patients. This test is for in vitro diagnostic use under the FDA EUA for laboratories certified under CLIA to perform high or moderate complexity testing. This test has not been FDA cleared or approved. A negative result does not rule out the presence of PCR inhibitors in the specimen or target RNA in concentration below the limit of detection for the assay. If only one viral target is positive but coinfection with multiple targets is suspected, the sample should be re-tested with another FDA cleared, approved, or authorized test, if coinfection would change clinical management. This test was validated by the [facility]. These laboratories are certified under the Clinical Laboratory Improvement Amendments of 1988 (CLIA-88) as qualified to perform high complexity laboratory testing.   CBC with platelets and differential     Status: Abnormal   Result Value Ref Range    WBC Count 4.6 4.0 - 11.0 10e3/uL    RBC Count 4.68 4.40 - 5.90 10e6/uL    Hemoglobin 12.9 (L) 13.3 - 17.7 g/dL    Hematocrit 38.1 (L) 40.0 - 53.0 %    MCV 81 78 - 100 fL    MCH 27.6 26.5 - 33.0 pg    MCHC 33.9 31.5 - 36.5 g/dL    RDW 12.4 10.0 - 15.0 %    Platelet Count 178 150 - 450 10e3/uL    % Neutrophils 56 %    % Lymphocytes 28 %    % Monocytes 10 %    % Eosinophils 5 %    % Basophils 1 %    % Immature Granulocytes 0 %    NRBCs per 100 WBC 0 <1 /100    Absolute Neutrophils 2.6 1.6 - 8.3 10e3/uL    Absolute Lymphocytes 1.3 0.8 - 5.3 10e3/uL    Absolute Monocytes 0.5 0.0 - 1.3 10e3/uL    Absolute Eosinophils 0.2 0.0 - 0.7 10e3/uL    Absolute Basophils 0.0 0.0 - 0.2 10e3/uL    Absolute Immature Granulocytes 0.0 <=0.4 10e3/uL    Absolute NRBCs 0.0 10e3/uL   EKG 12 lead     Status: None   Result Value Ref Range    Systolic Blood Pressure  mmHg    Diastolic Blood Pressure  mmHg    Ventricular Rate 58 BPM    Atrial Rate 58 BPM    WY Interval 146  ms    QRS Duration 90 ms     ms    QTc 402 ms    P Axis 69 degrees    R AXIS 69 degrees    T Axis 68 degrees    Interpretation ECG Sinus bradycardia  Otherwise normal ECG      CBC with platelets differential     Status: Abnormal    Narrative    The following orders were created for panel order CBC with platelets differential.  Procedure                               Abnormality         Status                     ---------                               -----------         ------                     CBC with platelets and d...[[id number]]  Abnormal            Final result                 Please view results for these tests on the individual orders.     Medications   lactated ringers BOLUS 1,000 mL (1,000 mLs Intravenous $New Bag 4/16/24 2213)   ipratropium - albuterol 0.5 mg/2.5 mg/3 mL (DUONEB) neb solution 3 mL (3 mLs Nebulization $Given 4/16/24 2214)   acetaminophen (TYLENOL) tablet 1,000 mg (1,000 mg Oral $Given 4/16/24 2214)   ketorolac (TORADOL) injection 10 mg (10 mg Intravenous $Given 4/16/24 2213)     Labs Ordered and Resulted from Time of ED Arrival to Time of ED Departure   COMPREHENSIVE METABOLIC PANEL - Abnormal       Result Value    Sodium 140      Potassium 3.6      Carbon Dioxide (CO2) 28      Anion Gap 7      Urea Nitrogen 7.3      Creatinine 0.65 (*)     GFR Estimate >90      Calcium 8.9      Chloride 105      Glucose 96      Alkaline Phosphatase 91      AST 29      ALT 33      Protein Total 6.8      Albumin 4.1      Bilirubin Total 0.2     LACTIC ACID WHOLE BLOOD - Abnormal    Lactic Acid 0.6 (*)    TSH WITH FREE T4 REFLEX - Abnormal    TSH 7.23 (*)    CBC WITH PLATELETS AND DIFFERENTIAL - Abnormal    WBC Count 4.6      RBC Count 4.68      Hemoglobin 12.9 (*)     Hematocrit 38.1 (*)     MCV 81      MCH 27.6      MCHC 33.9      RDW 12.4      Platelet Count 178      % Neutrophils 56      % Lymphocytes 28      % Monocytes 10      % Eosinophils 5      % Basophils 1      % Immature Granulocytes 0       NRBCs per 100 WBC 0      Absolute Neutrophils 2.6      Absolute Lymphocytes 1.3      Absolute Monocytes 0.5      Absolute Eosinophils 0.2      Absolute Basophils 0.0      Absolute Immature Granulocytes 0.0      Absolute NRBCs 0.0     LIPASE - Normal    Lipase 14     TROPONIN T, HIGH SENSITIVITY - Normal    Troponin T, High Sensitivity <6     MAGNESIUM - Normal    Magnesium 2.0     NT PROBNP INPATIENT - Normal    N terminal Pro BNP Inpatient 57     INFLUENZA A/B, RSV, & SARS-COV2 PCR - Normal    Influenza A PCR Negative      Influenza B PCR Negative      RSV PCR Negative      SARS CoV2 PCR Negative     T4 FREE     XR Chest 2 Views   Final Result   IMPRESSION: No change. Heart size normal. Severe scarring and volume loss involving left upper lobe with superior retraction of right hilum and prominent right apical pleural thickening. There is mild scarring and volume loss involving right upper lobe    which is stable. Lower lung zones clear, no new process.             Critical care was not performed.     Medical Decision Making  The patient's presentation was of high complexity (an acute health issue posing potential threat to life or bodily function).    The patient's evaluation involved:  ordering and/or review of 3+ test(s) in this encounter (see separate area of note for details)  independent interpretation of testing performed by another health professional (chest x-ray)    The patient's management necessitated high risk (a decision regarding hospitalization).    Assessment & Plan    Given patient's presentation an acute infection is most likely. we will rule out pneumonia. symptoms more consistent with viral syndrome. will test with rapid respiratory viral panel. treat with IV Toradol, Tylenol, and IV fluids.     Atypical chest pain workup to rule out acute coronary syndrome or pneumothorax with chest x-ray, EKG, labs including troponin, nebulizer treatment. Reassess.  Heart score less than 4,  PERC negative.      1115pm: I reviewed the chest x-ray imaging showing no signs of pneumonia.    No fever, elevated white count, or other concerning organ failure sign.   Will discharge with primary care follow-up, and recommendations for oral over-the-counter medication.  We had extensive conversation in [facility] regarding why patient does not need antibiotics at this time.      Troponin negative, EKG nonischemic.          I have reviewed the nursing notes. I have reviewed the findings, diagnosis, plan and need for follow up with the patient.    New Prescriptions    No medications on file       Final diagnoses:   Acute cough   Chest pain, unspecified type       Fermin Thompson MD  [facility] EMERGENCY DEPARTMENT  4/16/2024     Fermin Thompson MD  04/16/24 6059

## 2024-04-17 NOTE — ED TRIAGE NOTES
Triage Assessment (Adult)       Row Name 04/16/24 1959          Triage Assessment    Airway WDL WDL        Respiratory WDL    Respiratory WDL cough     Cough Frequency frequent     Cough Type dry;productive  mauro blood and brown sputum        Skin Circulation/Temperature WDL    Skin Circulation/Temperature WDL WDL        Cardiac WDL    Cardiac WDL WDL;rhythm     Pulse Rate & Regularity radial pulse regular

## 2024-07-03 ENCOUNTER — HOSPITAL ENCOUNTER (EMERGENCY)
Facility: CLINIC | Age: 39
Discharge: HOME | End: 2024-07-03
Attending: EMERGENCY MEDICINE | Admitting: EMERGENCY MEDICINE
Payer: COMMERCIAL

## 2024-07-03 ENCOUNTER — APPOINTMENT (OUTPATIENT)
Dept: GENERAL RADIOLOGY | Facility: CLINIC | Age: 39
End: 2024-07-03
Attending: EMERGENCY MEDICINE
Payer: COMMERCIAL

## 2024-07-03 VITALS
OXYGEN SATURATION: 99 % | HEART RATE: 86 BPM | SYSTOLIC BLOOD PRESSURE: 136 MMHG | DIASTOLIC BLOOD PRESSURE: 81 MMHG | RESPIRATION RATE: 18 BRPM | TEMPERATURE: 97.2 F

## 2024-07-03 DIAGNOSIS — R05.1 ACUTE COUGH: ICD-10-CM

## 2024-07-03 LAB
FLUAV RNA SPEC QL NAA+PROBE: NEGATIVE
FLUBV RNA RESP QL NAA+PROBE: NEGATIVE
RSV RNA SPEC NAA+PROBE: NEGATIVE
SARS-COV-2 RNA RESP QL NAA+PROBE: NEGATIVE

## 2024-07-03 PROCEDURE — 71046 X-RAY EXAM CHEST 2 VIEWS: CPT

## 2024-07-03 PROCEDURE — 87637 SARSCOV2&INF A&B&RSV AMP PRB: CPT | Performed by: EMERGENCY MEDICINE

## 2024-07-03 PROCEDURE — 99284 EMERGENCY DEPT VISIT MOD MDM: CPT | Performed by: EMERGENCY MEDICINE

## 2024-07-03 PROCEDURE — 99284 EMERGENCY DEPT VISIT MOD MDM: CPT | Mod: 25 | Performed by: EMERGENCY MEDICINE

## 2024-07-03 PROCEDURE — 71046 X-RAY EXAM CHEST 2 VIEWS: CPT | Mod: 26 | Performed by: RADIOLOGY

## 2024-07-03 RX ORDER — AZITHROMYCIN 250 MG/1
TABLET, FILM COATED ORAL
Qty: 6 TABLET | Refills: 0 | Status: SHIPPED | OUTPATIENT
Start: 2024-07-03 | End: 2024-07-08

## 2024-07-03 ASSESSMENT — COLUMBIA-SUICIDE SEVERITY RATING SCALE - C-SSRS
1. IN THE PAST MONTH, HAVE YOU WISHED YOU WERE DEAD OR WISHED YOU COULD GO TO SLEEP AND NOT WAKE UP?: NO
2. HAVE YOU ACTUALLY HAD ANY THOUGHTS OF KILLING YOURSELF IN THE PAST MONTH?: NO
6. HAVE YOU EVER DONE ANYTHING, STARTED TO DO ANYTHING, OR PREPARED TO DO ANYTHING TO END YOUR LIFE?: NO

## 2024-07-03 ASSESSMENT — ACTIVITIES OF DAILY LIVING (ADL)
ADLS_ACUITY_SCORE: 33
ADLS_ACUITY_SCORE: 33

## 2024-07-03 NOTE — ED TRIAGE NOTES
Pt presents to ED via EMS endorsing a cough x 5 weeks.  Woke up, drove himself to work and called an ambulance from his job      Triage Assessment (Adult)       Row Name 07/03/24 0544          Triage Assessment    Airway WDL WDL        Respiratory WDL    Respiratory WDL WDL        Skin Circulation/Temperature WDL    Skin Circulation/Temperature WDL WDL        Cardiac WDL    Cardiac WDL WDL     Cardiac Rhythm NSR        Peripheral/Neurovascular WDL    Peripheral Neurovascular WDL WDL        Cognitive/Neuro/Behavioral WDL    Cognitive/Neuro/Behavioral WDL WDL

## 2024-07-03 NOTE — ED PROVIDER NOTES
ED Provider Note  [facility]      History     Chief Complaint   Patient presents with    Cough     HPI  Lexy Cadena is a 39 year old male who presents the ED for evaluation of cough for the past 5 weeks.  He denies any recent travel or or sick contacts.  Does have a history of TB in 2013 and had a positive quantiferon TB test in 2021, which he completed rifampin therapy until April 2022.  No chest pain or shortness of breath.  Denies fever/chills or other constitutional symptoms.  Cough producing whitish sputum.    Past Medical History  Past Medical History:   Diagnosis Date    Arthritis     TB (pulmonary tuberculosis)     HISTORY, HAS BEEN TREATED    Uncomplicated asthma      History reviewed. No pertinent surgical history.  azithromycin (ZITHROMAX) 250 MG tablet  albuterol (PROAIR HFA/PROVENTIL HFA/VENTOLIN HFA) 108 (90 Base) MCG/ACT inhaler  fluticasone (FLOVENT HFA) 110 MCG/ACT inhaler  ibuprofen (ADVIL/MOTRIN) 200 MG tablet      No Known Allergies  Family History  History reviewed. No pertinent family history.  Social History   Social History     Tobacco Use    Smoking status: Never    Smokeless tobacco: Never   Vaping Use    Vaping status: Never Used   Substance Use Topics    Alcohol use: No     Alcohol/week: 0.0 standard drinks of alcohol    Drug use: No      A medically appropriate review of systems was performed with pertinent positives and negatives noted in the HPI, and all other systems negative.    Physical Exam   BP: 136/81  Pulse: 86  Temp: 97.2  F (36.2  C)  Resp: 18  SpO2: 99 %  Physical Exam  Vitals and nursing note reviewed.   Constitutional:       General: He is not in acute distress.     Appearance: Normal appearance.   HENT:      Head: Normocephalic.      Nose: Nose normal.   Eyes:      Pupils: Pupils are equal, round, and reactive to light.   Cardiovascular:      Rate and Rhythm: Normal rate and regular rhythm.   Pulmonary:      Effort: Pulmonary effort is normal.       Comments: Left-sided Rales  Abdominal:      General: There is no distension.   Musculoskeletal:         General: No deformity. Normal range of motion.      Cervical back: Normal range of motion.   Skin:     General: Skin is warm.   Neurological:      Mental Status: He is alert and oriented to person, place, and time.   Psychiatric:         Mood and Affect: Mood normal.           ED Course, Procedures, & Data           Results for orders placed or performed during the hospital encounter of 07/03/24   XR Chest 2 Views     Status: None    Narrative    EXAM: XR CHEST 2 VIEWS  LOCATION: [facility]  DATE: 7/3/2024    INDICATION: Cough  COMPARISON: 4/16/2024      Impression    IMPRESSION: Focal area of dense consolidation involving the left lung apex with left hilar retraction is unchanged. Linear scarring right lung apex unchanged. No new pulmonary infiltrates. Normal heart size and pulmonary vascularity. No overt osseous   abnormality.   Symptomatic Influenza A/B, RSV, & SARS-CoV2 PCR (COVID-19) Nasopharyngeal     Status: Normal    Specimen: Nasopharyngeal; Swab   Result Value Ref Range    Influenza A PCR Negative Negative    Influenza B PCR Negative Negative    RSV PCR Negative Negative    SARS CoV2 PCR Negative Negative    Narrative    Testing was performed using the Xpert Xpress CoV2/Flu/RSV Assay on the [employer] GeneXpert Instrument. This test should be ordered for the detection of SARS-CoV-2, influenza, and RSV viruses in individuals who meet clinical and/or epidemiological criteria. Test performance is unknown in asymptomatic patients. This test is for in vitro diagnostic use under the FDA EUA for laboratories certified under CLIA to perform high or moderate complexity testing. This test has not been FDA cleared or approved. A negative result does not rule out the presence of PCR inhibitors in the specimen or target RNA in concentration below the limit of detection  for the assay. If only one viral target is positive but coinfection with multiple targets is suspected, the sample should be re-tested with another FDA cleared, approved, or authorized test, if coinfection would change clinical management. This test was validated by the [email or web address]. These laboratories are certified under the Clinical Laboratory Improvement Amendments of 1988 (CLIA-88) as qualified to perform high complexity laboratory testing.     Medications - No data to display  Labs Ordered and Resulted from Time of ED Arrival to Time of ED Departure   INFLUENZA A/B, RSV, & SARS-COV2 PCR - Normal       Result Value    Influenza A PCR Negative      Influenza B PCR Negative      RSV PCR Negative      SARS CoV2 PCR Negative       XR Chest 2 Views   Final Result   IMPRESSION: Focal area of dense consolidation involving the left lung apex with left hilar retraction is unchanged. Linear scarring right lung apex unchanged. No new pulmonary infiltrates. Normal heart size and pulmonary vascularity. No overt osseous    abnormality.             Critical care was not performed.     Medical Decision Making  The patient's presentation was of moderate complexity (an acute illness with systemic symptoms).    The patient's evaluation involved:  ordering and/or review of 1 test(s) in this encounter (see separate area of note for details)    The patient's management necessitated moderate risk (prescription drug management including medications given in the ED).    Assessment & Plan    Patient presents the ED for evaluation of cough for the past 5 weeks.  On arrival, he has normal vital signs.  Overall appears well and nontoxic.  He is in no respiratory distress.  He does have basilar rales on left lung auscultation.  Chest x-ray shows scarring from prior tuberculosis infection, but no evidence of active infection.  COVID/influenza/RSV is negative.      Given persistence of symptoms, have started patient on  antibiotics.  Low suspicion for active TB.  Recommend PCP follow-up.  Educated reasons to return to the ED.      I have reviewed the nursing notes. I have reviewed the findings, diagnosis, plan and need for follow up with the patient.    Discharge Medication List as of 7/3/2024  7:18 AM        START taking these medications    Details   azithromycin (ZITHROMAX) 250 MG tablet Take 2 tablets (500 mg) by mouth daily for 1 day, THEN 1 tablet (250 mg) daily for 4 days., Disp-6 tablet, R-0, E-Prescribe             Final diagnoses:   Acute cough       Ramon Hdez DO  [facility] EMERGENCY DEPARTMENT  7/3/2024     Ramon Hdez DO  07/04/24 0639

## 2024-07-03 NOTE — DISCHARGE INSTRUCTIONS
Your viral testing was negative.  Chest x-ray shows scarring from prior infections but no obvious pneumonia.  Given the persistent severe cough, I have started you on antibiotics.  Please take these as prescribed and follow-up with your primary care provider.  Return to the ED with any new or worsening symptoms.  .

## 2024-11-10 ENCOUNTER — HOSPITAL ENCOUNTER (EMERGENCY)
Facility: CLINIC | Age: 39
Discharge: HOME | End: 2024-11-10
Attending: EMERGENCY MEDICINE | Admitting: EMERGENCY MEDICINE
Payer: COMMERCIAL

## 2024-11-10 VITALS
OXYGEN SATURATION: 99 % | SYSTOLIC BLOOD PRESSURE: 104 MMHG | HEIGHT: 67 IN | DIASTOLIC BLOOD PRESSURE: 70 MMHG | HEART RATE: 87 BPM | BODY MASS INDEX: 20.23 KG/M2 | WEIGHT: 128.9 LBS | TEMPERATURE: 97.7 F | RESPIRATION RATE: 20 BRPM

## 2024-11-10 DIAGNOSIS — M54.41 ACUTE BILATERAL LOW BACK PAIN WITH RIGHT-SIDED SCIATICA: ICD-10-CM

## 2024-11-10 PROCEDURE — 99284 EMERGENCY DEPT VISIT MOD MDM: CPT | Mod: 25 | Performed by: EMERGENCY MEDICINE

## 2024-11-10 PROCEDURE — 250N000013 HC RX MED GY IP 250 OP 250 PS 637: Performed by: EMERGENCY MEDICINE

## 2024-11-10 PROCEDURE — 99284 EMERGENCY DEPT VISIT MOD MDM: CPT | Performed by: EMERGENCY MEDICINE

## 2024-11-10 PROCEDURE — 250N000011 HC RX IP 250 OP 636: Performed by: EMERGENCY MEDICINE

## 2024-11-10 PROCEDURE — 96372 THER/PROPH/DIAG INJ SC/IM: CPT | Performed by: EMERGENCY MEDICINE

## 2024-11-10 RX ORDER — KETOROLAC TROMETHAMINE 30 MG/ML
30 INJECTION, SOLUTION INTRAMUSCULAR; INTRAVENOUS ONCE
Status: COMPLETED | OUTPATIENT
Start: 2024-11-10 | End: 2024-11-10

## 2024-11-10 RX ORDER — ACETAMINOPHEN 500 MG
1000 TABLET ORAL ONCE
Status: COMPLETED | OUTPATIENT
Start: 2024-11-10 | End: 2024-11-10

## 2024-11-10 RX ORDER — DICLOFENAC EPOLAMINE 0.01 G/1
1 SYSTEM TOPICAL ONCE
Status: DISCONTINUED | OUTPATIENT
Start: 2024-11-10 | End: 2024-11-10 | Stop reason: HOSPADM

## 2024-11-10 RX ORDER — GABAPENTIN 100 MG/1
100 CAPSULE ORAL 3 TIMES DAILY
Qty: 42 CAPSULE | Refills: 0 | Status: SHIPPED | OUTPATIENT
Start: 2024-11-10 | End: 2024-11-24

## 2024-11-10 RX ORDER — DICLOFENAC EPOLAMINE 0.01 G/1
1 SYSTEM TOPICAL 2 TIMES DAILY
Qty: 30 PATCH | Refills: 0 | Status: SHIPPED | OUTPATIENT
Start: 2024-11-10

## 2024-11-10 RX ADMIN — ACETAMINOPHEN 1000 MG: 500 TABLET ORAL at 11:16

## 2024-11-10 RX ADMIN — KETOROLAC TROMETHAMINE 30 MG: 30 INJECTION, SOLUTION INTRAMUSCULAR at 11:17

## 2024-11-10 RX ADMIN — DICLOFENAC EPOLAMINE 1 PATCH: 0.01 SYSTEM TOPICAL at 11:43

## 2024-11-10 ASSESSMENT — COLUMBIA-SUICIDE SEVERITY RATING SCALE - C-SSRS
6. HAVE YOU EVER DONE ANYTHING, STARTED TO DO ANYTHING, OR PREPARED TO DO ANYTHING TO END YOUR LIFE?: NO
2. HAVE YOU ACTUALLY HAD ANY THOUGHTS OF KILLING YOURSELF IN THE PAST MONTH?: NO
1. IN THE PAST MONTH, HAVE YOU WISHED YOU WERE DEAD OR WISHED YOU COULD GO TO SLEEP AND NOT WAKE UP?: NO

## 2024-11-10 ASSESSMENT — ACTIVITIES OF DAILY LIVING (ADL)
ADLS_ACUITY_SCORE: 0

## 2024-11-10 NOTE — ED PROVIDER NOTES
ED Provider Note  [facility]      History     Chief Complaint   Patient presents with    Back Pain     Denies injury.  Started after last night when woke up this morning.  States pain goes all the way around his waist and down and up his right leg     HPI  Lexy Cadena is a 39 year old male with a history of lumbar region radiculopathy who presents to the emergency department with back pain onset since last night.  He states that the pain goes all the way around his waist and up and down his right leg.  Patient experiencing associated numbness on his right leg as well.  He endorses history of sciatic pain in the past similar to this.  He denies any recent injury to his back.  He denies any problems with his muscles/weakness.  Denies incontinence or, diarrhea, saddle anesthesia.  He did take over-the-counter Tylenol last night.      Patient does endorse that he feels weak at his right leg at baseline.  No recent significant illness fever or other systemic symptoms.      Past Medical History  Past Medical History:   Diagnosis Date    Arthritis     TB (pulmonary tuberculosis)     HISTORY, HAS BEEN TREATED    Uncomplicated asthma      History reviewed. No pertinent surgical history.  albuterol (PROAIR HFA/PROVENTIL HFA/VENTOLIN HFA) 108 (90 Base) MCG/ACT inhaler  fluticasone (FLOVENT HFA) 110 MCG/ACT inhaler  ibuprofen (ADVIL/MOTRIN) 200 MG tablet      Allergies   Allergen Reactions    Seasonal Allergies Cough     Family History  No family history on file.  Social History   Social History     Tobacco Use    Smoking status: Never    Smokeless tobacco: Never   Vaping Use    Vaping status: Never Used   Substance Use Topics    Alcohol use: No     Alcohol/week: 0.0 standard drinks of alcohol    Drug use: No      A medically appropriate review of systems was performed with pertinent positives and negatives noted in the HPI, and all other systems negative.    Physical Exam   BP: 102/68  Pulse:  "87  Temp: 97.6  F (36.4  C)  Resp: 20  Height: 170.2 cm (5' 7\")  Weight: 58.5 kg (128 lb 14.4 oz)  SpO2: 99 %  Physical Exam  General: awake, alert, NAD  Head: normal cephalic  HEENT: pupils equal, conjugate gaze intact  Neck: Supple  CV: regular rate  Lungs: Breathing comfortably on room air  Abd: soft, non-tender, no guarding, no peritoneal signs  Back: Patient has both midline back tenderness and also right-sided paraspinal tenderness.  No rashes lesions or deformity noted.  EXT: lower extremities without swelling or edema, no evidence of trauma.  Normal range of motion of the hips and knees  Neuro: awake, answers questions appropriately. No focal deficits noted ; patient has symmetric 5 out of 5 strength of his bilateral lower extremities in hip flexion ankle dorsi flexion extension and dorsiflexion of the great toe.  Sensation to light touch is intact and equal bilaterally.    ED Course, Procedures, & Data      Procedures            No results found for any visits on 11/10/24.  Medications   diclofenac (FLECTOR) 1.3 % patch 1 patch (1 patch Apply externally $Patch/Med Applied 11/10/24 1143)   ketorolac (TORADOL) injection 30 mg (30 mg Intramuscular $Given 11/10/24 1117)   acetaminophen (TYLENOL) tablet 1,000 mg (1,000 mg Oral $Given 11/10/24 1116)     Labs Ordered and Resulted from Time of ED Arrival to Time of ED Departure - No data to display  No orders to display          Critical care was not performed.     Medical Decision Making  The patient's presentation was of low complexity (a stable chronic illness).    The patient's evaluation involved:  review of 1 test result(s) ordered prior to this encounter (see separate area of note for details)    The patient's management necessitated moderate risk (prescription drug management including medications given in the ED).    Assessment & Plan    Lexy is a 39-year-old male with history of low back pain and sciatica who presents with recurrence of sciatica worse on " his right lower extremity.  There are no red flag symptoms on history or physical exam.  Do not feel that imaging or laboratory studies are warranted at this time.  Was treated with diclofenac patches, Toradol Tylenol.  On reassessment patient reported improvement of his symptoms.  He states he was last seen by a back specialist 2 years ago, per chart review he did have a MR performed 2 years ago that showed some degenerative disc disease.  Patient is neurologically intact without red flag symptoms.  Reviewed ER return precautions with patient and recommend follow-up with Ortho.  I will put in a referral for this.  Will recommend outpatient management with Tylenol, ibuprofen, diclofenac and can also trial gabapentin.  Patient had normal kidney function earlier this year.     I have reviewed the nursing notes. I have reviewed the findings, diagnosis, plan and need for follow up with the patient.    New Prescriptions    No medications on file       Final diagnoses:   Acute bilateral low back pain with right-sided sciatica   I, Daisha Garber, am serving as a trained medical scribe to document services personally performed by Eliel Marks MD, based on the provider's statements to me.     IEliel MD, was physically present and have reviewed and verified the accuracy of this note documented by Daisha Garebr.     Eliel Marks MD  [facility] EMERGENCY DEPARTMENT  11/10/2024     Eliel Marks MD  11/10/24 4629

## 2024-11-10 NOTE — DISCHARGE INSTRUCTIONS
Take 1000 mg of Tylenol and 600 mg of ibuprofen every 6 hours for pain control.    Can use diclofenac topically for back pain and can also trial gabapentin.  Be careful as gabapentin can be sedating so do not take this if you need to drive a car.  Follow-up with orthopedics in 1 to 2 weeks I did put in a referral for this.

## 2025-02-25 ENCOUNTER — ANCILLARY PROCEDURE (OUTPATIENT)
Dept: GENERAL RADIOLOGY | Facility: CLINIC | Age: 40
End: 2025-02-25
Attending: FAMILY MEDICINE
Payer: COMMERCIAL

## 2025-02-25 ENCOUNTER — OFFICE VISIT (OUTPATIENT)
Dept: FAMILY MEDICINE | Facility: CLINIC | Age: 40
End: 2025-02-25
Payer: COMMERCIAL

## 2025-02-25 VITALS
OXYGEN SATURATION: 96 % | HEART RATE: 84 BPM | TEMPERATURE: 97.3 F | DIASTOLIC BLOOD PRESSURE: 78 MMHG | BODY MASS INDEX: 20.81 KG/M2 | WEIGHT: 132.6 LBS | RESPIRATION RATE: 17 BRPM | HEIGHT: 67 IN | SYSTOLIC BLOOD PRESSURE: 111 MMHG

## 2025-02-25 DIAGNOSIS — Z76.89 ENCOUNTER TO ESTABLISH CARE WITH NEW DOCTOR: ICD-10-CM

## 2025-02-25 DIAGNOSIS — R05.2 SUBACUTE COUGH: ICD-10-CM

## 2025-02-25 DIAGNOSIS — R05.2 SUBACUTE COUGH: Primary | ICD-10-CM

## 2025-02-25 DIAGNOSIS — Z86.11 HISTORY OF TB (TUBERCULOSIS): ICD-10-CM

## 2025-02-25 LAB
ALBUMIN SERPL BCG-MCNC: 4.3 G/DL (ref 3.5–5.2)
ALP SERPL-CCNC: 101 U/L (ref 40–150)
ALT SERPL W P-5'-P-CCNC: 17 U/L (ref 0–70)
ANION GAP SERPL CALCULATED.3IONS-SCNC: 10 MMOL/L (ref 7–15)
AST SERPL W P-5'-P-CCNC: 20 U/L (ref 0–45)
BASOPHILS # BLD AUTO: 0 10E3/UL (ref 0–0.2)
BASOPHILS NFR BLD AUTO: 1 %
BILIRUB SERPL-MCNC: 0.3 MG/DL
BUN SERPL-MCNC: 9.6 MG/DL (ref 6–20)
CALCIUM SERPL-MCNC: 9 MG/DL (ref 8.8–10.4)
CHLORIDE SERPL-SCNC: 105 MMOL/L (ref 98–107)
CREAT SERPL-MCNC: 0.66 MG/DL (ref 0.67–1.17)
EGFRCR SERPLBLD CKD-EPI 2021: >90 ML/MIN/1.73M2
EOSINOPHIL # BLD AUTO: 0.1 10E3/UL (ref 0–0.7)
EOSINOPHIL NFR BLD AUTO: 2 %
ERYTHROCYTE [DISTWIDTH] IN BLOOD BY AUTOMATED COUNT: 12.8 % (ref 10–15)
GLUCOSE SERPL-MCNC: 88 MG/DL (ref 70–99)
HCO3 SERPL-SCNC: 26 MMOL/L (ref 22–29)
HCT VFR BLD AUTO: 42.3 % (ref 40–53)
HGB BLD-MCNC: 13.6 G/DL (ref 13.3–17.7)
IMM GRANULOCYTES # BLD: 0 10E3/UL
IMM GRANULOCYTES NFR BLD: 0 %
LYMPHOCYTES # BLD AUTO: 1.1 10E3/UL (ref 0.8–5.3)
LYMPHOCYTES NFR BLD AUTO: 32 %
MCH RBC QN AUTO: 27.8 PG (ref 26.5–33)
MCHC RBC AUTO-ENTMCNC: 32.2 G/DL (ref 31.5–36.5)
MCV RBC AUTO: 86 FL (ref 78–100)
MONOCYTES # BLD AUTO: 0.3 10E3/UL (ref 0–1.3)
MONOCYTES NFR BLD AUTO: 9 %
NEUTROPHILS # BLD AUTO: 2 10E3/UL (ref 1.6–8.3)
NEUTROPHILS NFR BLD AUTO: 57 %
NRBC # BLD AUTO: 0 10E3/UL
NRBC BLD AUTO-RTO: 0 /100
PLATELET # BLD AUTO: 173 10E3/UL (ref 150–450)
POTASSIUM SERPL-SCNC: 3.8 MMOL/L (ref 3.4–5.3)
PROT SERPL-MCNC: 7 G/DL (ref 6.4–8.3)
RBC # BLD AUTO: 4.9 10E6/UL (ref 4.4–5.9)
SODIUM SERPL-SCNC: 141 MMOL/L (ref 135–145)
WBC # BLD AUTO: 3.6 10E3/UL (ref 4–11)

## 2025-02-25 PROCEDURE — 71046 X-RAY EXAM CHEST 2 VIEWS: CPT | Mod: FY | Performed by: RADIOLOGY

## 2025-02-25 RX ORDER — MOMETASONE FUROATE AND FORMOTEROL FUMARATE DIHYDRATE 100; 5 UG/1; UG/1
2 AEROSOL RESPIRATORY (INHALATION) 2 TIMES DAILY
COMMUNITY
Start: 2024-12-13

## 2025-02-25 RX ORDER — ACETAMINOPHEN 500 MG
500-1000 TABLET ORAL EVERY 6 HOURS PRN
COMMUNITY

## 2025-02-25 RX ORDER — MONTELUKAST SODIUM 10 MG/1
1 TABLET ORAL AT BEDTIME
COMMUNITY

## 2025-02-25 ASSESSMENT — ASTHMA QUESTIONNAIRES
ACT_TOTALSCORE: 17
QUESTION_4 LAST FOUR WEEKS HOW OFTEN HAVE YOU USED YOUR RESCUE INHALER OR NEBULIZER MEDICATION (SUCH AS ALBUTEROL): TWO OR THREE TIMES PER WEEK
QUESTION_1 LAST FOUR WEEKS HOW MUCH OF THE TIME DID YOUR ASTHMA KEEP YOU FROM GETTING AS MUCH DONE AT WORK, SCHOOL OR AT HOME: NONE OF THE TIME
ACT_TOTALSCORE: 17
QUESTION_3 LAST FOUR WEEKS HOW OFTEN DID YOUR ASTHMA SYMPTOMS (WHEEZING, COUGHING, SHORTNESS OF BREATH, CHEST TIGHTNESS OR PAIN) WAKE YOU UP AT NIGHT OR EARLIER THAN USUAL IN THE MORNING: ONCE A WEEK
QUESTION_2 LAST FOUR WEEKS HOW OFTEN HAVE YOU HAD SHORTNESS OF BREATH: THREE TO SIX TIMES A WEEK
QUESTION_5 LAST FOUR WEEKS HOW WOULD YOU RATE YOUR ASTHMA CONTROL: SOMEWHAT CONTROLLED

## 2025-02-25 ASSESSMENT — PAIN SCALES - GENERAL: PAINLEVEL_OUTOF10: NO PAIN (0)

## 2025-02-25 NOTE — PATIENT INSTRUCTIONS
Patient Education   Here is the plan from today's visit    1. Subacute cough (Primary)  2. History of TB (tuberculosis)  Continue to use your dulera inhaler 2 puff BID and singular at night. We will contact you with the results of your blood tests. Please collect the sputum samples as instructed by the lab. Infectious Disease referral placed. They will call you to schedule this appointment.   - Pneumococcal 20 Valent Conjugate (Prevnar 20)  - Acid-Fast Bacilli Culture and Stain; Future  - Acid-Fast Bacilli Culture and Stain; Future  - Acid-Fast Bacilli Culture and Stain; Future  - Gram stain ([id number]); Future  - Gram stain ([id number]); Future  - Gram stain ([id number]); Future  - CBC with platelets differential; Future  - Comprehensive metabolic panel; Future  - XR CHEST 2 VW; Future  - Mycobacterium tuberculosis Complex Detection and Rifampin Resistance by PCR with AFB Culture and AFB Stain; Future  - RT Education Referral; Future  - Sputum induction by RT; Future  - Acid-Fast Bacilli Culture and Stain  - Acid-Fast Bacilli Culture and Stain  - Acid-Fast Bacilli Culture and Stain  - Gram stain ([id number])  - Gram stain (NAX111)  - Gram stain ([id number])  - CBC with platelets differential  - Comprehensive metabolic panel  - Mycobacterium tuberculosis Complex Detection and Rifampin Resistance by PCR with AFB Culture and AFB Stain  - Adult Infectious Disease  Referral; Future              Please call or return to clinic if your symptoms don't go away.    Follow up plan  No follow-ups on file.    Thank you for coming to [facility] today.  Lab Testing:  **If you had lab testing today and your results are reassuring or normal they will be mailed to you or sent through [employer] within 7 days.   **If the lab tests need quick action we will call you with the results.  **If you are having labs done on a different day, please call [phone] to schedule at [facility] or [phone] for other [facility]  locations. Labs do not offer walk-in appointments.  The phone number we will call with results is # [phone] (home) . If this is not the best number please call our clinic and change the number.  Medication Refills:  If you need any refills please call your pharmacy and they will contact us.   If you need to  your refill at a new pharmacy, please contact the new pharmacy directly. The new pharmacy will help you get your medications transferred faster.   Scheduling:  If you have any concerns about today's visit or wish to schedule another appointment please call our office during normal business hours [phone] (8-5:00 M-F). If you can no longer make a scheduled visit, please cancel via [employer] or call us to cancel.   If a referral was made to an [facility] specialty provider and you do not get a call from central scheduling, please refer to directions on your visit summary or call our office during normal business hours for assistance.   If a Mammogram was ordered for you at the [facility] call [phone] to schedule or change your appointment.  If you had an XRay/CT/Ultrasound/MRI ordered the number is [phone] to schedule or change your radiology appointment.   [facility] has limited ultrasound appointments available on Wednesdays, if you would like your ultrasound at [facility], please call [phone] to schedule.   Medical Concerns:  If you have urgent medical concerns please call [phone] at any time of the day.    Renee Bentley MD

## 2025-02-25 NOTE — PROGRESS NOTES
Preceptor Attestation:   Patient seen, evaluated and discussed with the resident. I have verified the content of the note, which accurately reflects my assessment of the patient and the plan of care.   Supervising Physician:  Rober Maurer MD

## 2025-02-25 NOTE — PROGRESS NOTES
"  Assessment & Plan     {Diag Picklist:275452}            {FOLLOW UP PLANS (Optional) Includes COVID19 Treatment Plan:895561}    No follow-ups on file.    Angel Pugh is a 40 year old, presenting for the following health issues:  Establish Care (New Est)      2/25/2025     9:27 AM   Additional Questions   Roomed by Reji   Accompanied by Self         2/25/2025    Information    services provided? Yes   Language [place] Islander   Type of interpretation provided Face-to-face    name ronnie    Agency [employer]     Establishing care    Only has gone to urgent care and walk-in clinics   {MA/LPN/RN Pre-Provider Visit Orders- hCG/UA/Strep (Optional):265066}  {SUPERLIST (Optional):612120}  {additonal problems for provider to add (Optional):432885}    {ROS Picklists (Optional):226560}      Objective    /78 (BP Location: Left arm, Patient Position: Sitting, Cuff Size: Adult Regular)   Pulse 84   Temp 97.3  F (36.3  C) (Temporal)   Resp 17   Ht 1.702 m (5' 7\")   Wt 60.1 kg (132 lb 9.6 oz)   SpO2 96%   BMI 20.77 kg/m    Body mass index is 20.77 kg/m .  Physical Exam   {Exam List (Optional):660387}    {Diagnostic Test Results (Optional):709058}        Signed Electronically by: Renee Bentley MD  {Email feedback regarding this note to [email or web address]   :724393}  " "  Additional Questions   Roomed by Reji   Accompanied by Self         2/25/2025    Information    services provided? Yes   Language Peruvian   Type of interpretation provided Face-to-face    name ronnie    Agency [employer]     Establishing care  Only has gone to urgent care and walk-in clinics     Is most concerned with a persistent productive cough that occurs throughout the day with intermittent hemoptysis (>2x per month, usually in the morning).   Thick mucous - sometimes clear   Cough is \"nonstop\"   Laying down at night makes cough worse  Sometimes feels feverish and sweaty   Has been taking montelukast nightly consistently and using Dulera inhaler 1-2x/day without symptomatic improvement.     Previously treated for TB before moving to    Medication restarted in [place] (rifampin?)          Objective    /78 (BP Location: Left arm, Patient Position: Sitting, Cuff Size: Adult Regular)   Pulse 84   Temp 97.3  F (36.3  C) (Temporal)   Resp 17   Ht 1.702 m (5' 7\")   Wt 60.1 kg (132 lb 9.6 oz)   SpO2 96%   BMI 20.77 kg/m    Body mass index is 20.77 kg/m .  Physical Exam   Vitals reviewed.   Constitutional: awake, alert, cooperative, in NAD  HEENT: no rhinorrhea. No nasal congestion. Moist mucous membranes  Respiratory: Lungs CTAB without increased work of breathing. Deep breaths trigger cough  Cardiovascular: RRR without murmurs or extra sounds  Skin: No visible lesions, erythema, rashes, or ecchymosis  Musculoskeletal: No extremity redness, swelling, or bony tenderness  Neurologic: A&Ox4 without focal deficit, cranial nerves II-XII grossly intact  Psychiatric: Alert & calm with appropriate affect, mood, and thought processes    Diagnostic Test Results:  CXR - Reviewed and interpreted by me. Grossly unchanged from prior CXR in 07/2024. Persistent left upper lobe consolidation   Results for orders placed or performed in visit on 02/25/25   XR CHEST 2 " VW     Status: None    Narrative    EXAM: XR CHEST 2 VIEWS 2/25/2025 11:18 AM    DEMOGRAPHICS: 40 years Male    INDICATION: Subacute cough    COMPARISON: Radiograph 7/3/2024    TECHNIQUE: PA and lateral views of the chest.    FINDINGS:   2 views of the chest obtained.  Trachea is midline. Unchanged cardiomediastinal silhouette size.  Redemonstration of focal dense consolidation and atelectatic changes  in the left upper lobe. Left suprahilar retraction and minimal left  tracheal shift to the left.  No discernible pneumothorax or pleural effusion. Unremarkable upper  abdomen. No acute or suspicious osseous abnormalities. Unremarkable  soft tissues.      Impression    IMPRESSION:   1.  Demonstration of focal dense consolidation and atelectatic changes  in the left upper lobe.  2.  No acute radiographic cardiopulmonary abnormality in this  examination.    I have personally reviewed the examination and initial interpretation  and I agree with the findings.    AIRAM ALBARRAN MD         SYSTEM ID:  [id number]   Results for orders placed or performed in visit on 02/25/25   Comprehensive metabolic panel     Status: Abnormal   Result Value Ref Range    Sodium 141 135 - 145 mmol/L    Potassium 3.8 3.4 - 5.3 mmol/L    Carbon Dioxide (CO2) 26 22 - 29 mmol/L    Anion Gap 10 7 - 15 mmol/L    Urea Nitrogen 9.6 6.0 - 20.0 mg/dL    Creatinine 0.66 (L) 0.67 - 1.17 mg/dL    GFR Estimate >90 >60 mL/min/1.73m2    Calcium 9.0 8.8 - 10.4 mg/dL    Chloride 105 98 - 107 mmol/L    Glucose 88 70 - 99 mg/dL    Alkaline Phosphatase 101 40 - 150 U/L    AST 20 0 - 45 U/L    ALT 17 0 - 70 U/L    Protein Total 7.0 6.4 - 8.3 g/dL    Albumin 4.3 3.5 - 5.2 g/dL    Bilirubin Total 0.3 <=1.2 mg/dL   CBC with platelets and differential     Status: Abnormal   Result Value Ref Range    WBC Count 3.6 (L) 4.0 - 11.0 10e3/uL    RBC Count 4.90 4.40 - 5.90 10e6/uL    Hemoglobin 13.6 13.3 - 17.7 g/dL    Hematocrit 42.3 40.0 - 53.0 %    MCV 86 78 - 100 fL     MCH 27.8 26.5 - 33.0 pg    MCHC 32.2 31.5 - 36.5 g/dL    RDW 12.8 10.0 - 15.0 %    Platelet Count 173 150 - 450 10e3/uL    % Neutrophils 57 %    % Lymphocytes 32 %    % Monocytes 9 %    % Eosinophils 2 %    % Basophils 1 %    % Immature Granulocytes 0 %    NRBCs per 100 WBC 0 <1 /100    Absolute Neutrophils 2.0 1.6 - 8.3 10e3/uL    Absolute Lymphocytes 1.1 0.8 - 5.3 10e3/uL    Absolute Monocytes 0.3 0.0 - 1.3 10e3/uL    Absolute Eosinophils 0.1 0.0 - 0.7 10e3/uL    Absolute Basophils 0.0 0.0 - 0.2 10e3/uL    Absolute Immature Granulocytes 0.0 <=0.4 10e3/uL    Absolute NRBCs 0.0 10e3/uL   Acid-Fast Bacilli Culture and Stain     Status: None (In process)    Specimen: Expectorate; Sputum    Narrative    The following orders were created for panel order Acid-Fast Bacilli Culture and Stain.  Procedure                               Abnormality         Status                     ---------                               -----------         ------                     Acid-Fast Bacilli Cultur...[[id number]]                      Preliminary result           Please view results for these tests on the individual orders.   Acid-Fast Bacilli Culture and Stain     Status: None (In process)    Specimen: Expectorate; Sputum    Narrative    The following orders were created for panel order Acid-Fast Bacilli Culture and Stain.  Procedure                               Abnormality         Status                     ---------                               -----------         ------                     Acid-Fast Bacilli Cultur...[[id number]]                      Preliminary result           Please view results for these tests on the individual orders.   Acid-Fast Bacilli Culture and Stain     Status: None ()    Specimen: Sputum    Narrative    The following orders were created for panel order Acid-Fast Bacilli Culture and Stain.  Procedure                               Abnormality         Status                     ---------                                -----------         ------                     Acid-Fast Bacilli Cultur...[[id number]]                                                   Please view results for these tests on the individual orders.   Gram stain ([id number])     Status: None    Specimen: Expectorate; Sputum   Result Value Ref Range    GS Culture See corresponding culture for results     Gram Stain Result <10 Squamous epithelial cells/low power field     Gram Stain Result >25 PMNs/low power field     Gram Stain Result 3+ Mixed nick    Gram stain ([id number])     Status: None    Specimen: Expectorate; Sputum   Result Value Ref Range    GS Culture See corresponding culture for results     Gram Stain Result >10 Squamous epithelial cells/low power field     Gram Stain Result >25 PMNs/low power field     Gram Stain Result 3+ Mixed nick    Acid-Fast Bacilli Culture and Stain     Status: None (Preliminary result)    Specimen: Expectorate; Sputum   Result Value Ref Range    Acid Fast Stain No acid fast bacilli seen     Acid Fast Stain No acid fast bacilli seen     Narrative    Specimen received and in progress.   Acid-Fast Bacilli Culture and Stain     Status: None (Preliminary result)    Specimen: Expectorate; Sputum   Result Value Ref Range    Acid Fast Stain No acid fast bacilli seen     Acid Fast Stain No acid fast bacilli seen     Narrative    Specimen received and in progress.   Mycobacterium tuberculosis Complex Detection and Rifampin Resistance by PCR     Status: None    Specimen: Expectorate; Sputum   Result Value Ref Range    MTB Complex DNA Not Detected Not Detected    Rifampin Resistance      Narrative    The [employer] Xpert MTB/RIF assay is a qualitative, nested real-time PCR diagnostic test for the detection of Mycobacterium tuberculosis complex DNA in induced or expectorated sputum. In specimens where MTB-complex is detected, the Xpert MTB/RIF assay also detects the rifampin-resistance associated mutations of the rpoB gene.  The [employer]  Xpert MTB/RIF assay is intended for use with specimens from patients for whom there is clinical suspicion of tuberculosis and who have received no antituberculosis therapy, or less than 3 days of therapy. The Xpert MTB/RIF assay must always be used in conjuction with mycobacterial culture to address the risk of false negative results and to recover organisms when MTB complex is present for further characterization and drug susceptibility testing.     Acid-Fast Bacilli Culture and Stain     Status: None (Preliminary result)    Specimen: Expectorate; Sputum   Result Value Ref Range    Acid Fast Stain No acid fast bacilli seen     Acid Fast Stain No acid fast bacilli seen     Narrative    Specimen received and in progress.   Positive culture results are reported as soon as detected.   Final report to follow in seven to eight weeks    Performed By: [employer]  [address] [place] [id number]  : Dino Cavanaugh MD, PhD  CLIA Number: [id number]   CBC with platelets differential     Status: Abnormal    Narrative    The following orders were created for panel order CBC with platelets differential.  Procedure                               Abnormality         Status                     ---------                               -----------         ------                     CBC with platelets and d...[[id number]]  Abnormal            Final result                 Please view results for these tests on the individual orders.   Mycobacterium tuberculosis Complex Detection and Rifampin Resistance by PCR with AFB Culture and AFB Stain     Status: None (In process)    Specimen: Expectorate; Sputum    Narrative    The following orders were created for panel order Mycobacterium tuberculosis Complex Detection and Rifampin Resistance by PCR with AFB Culture and AFB Stain.  Procedure                               Abnormality         Status                     ---------                                -----------         ------                     Mycobacterium tuberculos...[[id number]]                      Final result               Acid-Fast Bacilli Cultur...[[id number]]                      Preliminary result           Please view results for these tests on the individual orders.           Signed Electronically by: Renee Bentley MD

## 2025-02-26 ENCOUNTER — TRANSFERRED RECORDS (OUTPATIENT)
Dept: HEALTH INFORMATION MANAGEMENT | Facility: CLINIC | Age: 40
End: 2025-02-26
Payer: COMMERCIAL

## 2025-02-26 LAB
M TB DNA SPEC QL NAA+PROBE: NOT DETECTED
MTB CMPLX RPOB RIF MUT ISLT/SPM: NORMAL

## 2025-02-27 LAB
BACTERIA SPEC CULT: NORMAL
GRAM STAIN RESULT: NORMAL

## 2025-02-27 NOTE — CONFIDENTIAL NOTE
DIAGNOSIS:    Subacute cough   History of TB (tuberculosis)      DATE RECEIVED:  03.03.2025    NOTES (Gather within 2 years) STATUS DETAILS   OFFICE NOTE from referring provider   Internal 02.25.2025 Rober Maurer MD    LABS (any labs) Internal    MEDICATION LIST Internal    IMAGING  (NEED IMAGES AND REPORTS)     Other (anything related to diagnoses Internal 02.25.2025 XR CHEST 2 VIEWS

## 2025-02-28 LAB
ACID FAST STAIN (ARUP): NORMAL
ACID FAST STAIN (ARUP): NORMAL
BACTERIA SPEC CULT: NORMAL
GRAM STAIN RESULT: NORMAL

## 2025-03-01 LAB
ACID FAST STAIN (ARUP): NORMAL
ACID FAST STAIN (ARUP): NORMAL

## 2025-03-02 PROBLEM — R05.3 PERSISTENT COUGH: Status: ACTIVE | Noted: 2025-03-02

## 2025-03-02 LAB
ACID FAST STAIN (ARUP): NORMAL
ACID FAST STAIN (ARUP): NORMAL

## 2025-03-03 ENCOUNTER — VIRTUAL VISIT (OUTPATIENT)
Dept: INFECTIOUS DISEASES | Facility: CLINIC | Age: 40
End: 2025-03-03
Attending: FAMILY MEDICINE
Payer: COMMERCIAL

## 2025-03-03 ENCOUNTER — PRE VISIT (OUTPATIENT)
Dept: INFECTIOUS DISEASES | Facility: CLINIC | Age: 40
End: 2025-03-03
Payer: COMMERCIAL

## 2025-03-03 VITALS — HEIGHT: 67 IN | BODY MASS INDEX: 20.4 KG/M2 | WEIGHT: 130 LBS

## 2025-03-03 DIAGNOSIS — Z86.19 H/O MYCOBACTERIUM AVIUM COMPLEX INFECTION: ICD-10-CM

## 2025-03-03 DIAGNOSIS — R05.2 SUBACUTE COUGH: ICD-10-CM

## 2025-03-03 DIAGNOSIS — R05.9 COUGH, UNSPECIFIED TYPE: Primary | ICD-10-CM

## 2025-03-03 DIAGNOSIS — Z86.11 HISTORY OF TB (TUBERCULOSIS): ICD-10-CM

## 2025-03-03 PROCEDURE — 1126F AMNT PAIN NOTED NONE PRSNT: CPT | Mod: 95 | Performed by: STUDENT IN AN ORGANIZED HEALTH CARE EDUCATION/TRAINING PROGRAM

## 2025-03-03 PROCEDURE — 98003 SYNCH AUDIO-VIDEO NEW HI 60: CPT | Performed by: STUDENT IN AN ORGANIZED HEALTH CARE EDUCATION/TRAINING PROGRAM

## 2025-03-03 ASSESSMENT — PAIN SCALES - GENERAL: PAINLEVEL_OUTOF10: NO PAIN (0)

## 2025-03-03 NOTE — PROGRESS NOTES
Virtual Visit Details    Type of service:  Video Visit   Video Start Time:  9:50AM (unable to start visit, as patient has been having technical issues)  Video End Time: 10:40AM    Originating Location (pt. Location): Other In [facility]    Distant Location (provider location):  On-site  Platform used for Video Visit: AmWell  ====================================================    [facility] [address] [address]  Phone: [phone]  Fax: [phone]    Patient:  Lexy Cadena, Date of birth 1985  Date of Visit:  03/03/2025  Referring Provider: Rober Maurer  Reason for visit: 'Mycobacterium (including Tuberculosis)'      Assessment & Plan    ID Problem List:  Cough, almost for ~10 years per patient  Left upper lobe focal consolidation, with atelectasis per radiographic findings likely sequela of previous pulmonary tuberculosis.   Hx of Mycobacterium avium s/p treatment with 3-drug regimen for about 6 months (2013)  Azithromycin, rifampin, ethambutol   Hx of active pulmonary tuberculosis s/p treatment with 6-months regimen (~2008, while in [place] as a refugee camp).   Positive Quantiferon test  (5/27/2013, 3/17/2016)  Asthma on Dulera    Assessment:  41 yo Syrian-speaking male with PMHx of treated pulmonary tuberculosis, pulmonary NTM as details outlined here who is presented with long standing cough ~10 years, yellow colored, with occasional bloody streaks without any other systemic complaints. Agree with work up - sputum AFB stain/culture x3 specimens which have resulted negative for stain x3 and MTB PCR probe. Though based on clinical history, low suspicion of active, new pulmonary tuberculosis. Limitation of this encounter, is that unable to examine due to virtual video visit. Suggest obtaining CT chest to assess if underlying etiology that explains cough. Also will refer to Pulmonology to weigh in.  ID follow up based on  pending work up.     Recommendations:   Agree with sputum AFB stain/cultures x3 specimens.  So far negative AFB stain x3 including negative MTB PCR probe  Would add routine sputum culture with Gram stain.   CT chest w/o contrast  Pulmonology referral   Should coordinate with ambulatory Infection Prevention and Control regarding   ID follow up based on availability of above work up    [place]ese-speaking phone  service is utilized throughout this encounter.     70 minutes spent by me on the date of the encounter doing chart review, history and exam, documentation and further activities per the note.      History of Present Illness     Pertinent history obtain from: chart review and patient  Mr. Cadena is referred to ID consult due to persistent cough. PMHx of pulmonary tuberculosis s/p treatment (~2008), pulmonary NTM s/p 6 months treatment (2013), asthma.     Patient reports cough for about 10 years now, with yellow colored phlegm, about 1 teaspoonful (at least 10-15 times per day), with bloody streaks when excessive coughing bouts. States that throat pain with excessive coughing which is bothersome. States that 'there are times I have fevers but not right now, and issue is cough', but could not provide further clarity and never measured temperature at home. Denies chills, night sweating, no change in appetite, or unintentional weight loss. Denies difficulty in breathing, chest pain or congestion. The only breathing related issues is underlying asthma-related which responds well to inhalers.     He believes that long standing issue of cough which is the main problem and seeking the resolution.      Also recalls, hx of pulmonary Tuberculosis (while living as a refugee in [place], [place]) and treated for active Tb s/p 6-months treatment (unable to recall details). Per patient, was also receiving treatment while living in [place] and then relocated to [place] (when he was told to stop it).      SHx: (also per review  of old notes in this EMR)  Born and raised in [place], the lived in [place], [place] (2003-Dec 2012).   [place] to [place] in [place]. [place], [place] x3 months, [place] and since lived here, except briefly lived in [place] x3 months. 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 , lives with wife and has 2 children (9 yo, 3 yo).     Specialty Problems          Infectious Diseases    TB (tuberculosis)            Physical Exam   General: on room air, able to speak in complete sentences, breathing appears comfortable.   Otherwise deferred, due to virtual video visit    Data   Laboratory data and imaging listed below was reviewed prior to this encounter.   Quantiferon - positive (5/27/2013, 3/17/2016)    Microbiology:     Culture   Date Value Ref Range Status   02/28/2025 See corresponding culture for results  Final   02/27/2025 See corresponding culture for results  Final     Hematology Studies:    Recent Labs   Lab Test 02/25/25  1145 04/16/24  2205 03/27/19  1240   WBC 3.6* 4.6  --    HGB 13.6 12.9*  --    MCV 86 81  --     178 154     Metabolic Studies:   Recent Labs   Lab Test 02/25/25  1145 04/16/24  2205    140   POTASSIUM 3.8 3.6   CHLORIDE 105 105   CO2 26 28   BUN 9.6 7.3   CR 0.66* 0.65*   GFRESTIMATED >90 >90     Hepatic Studies:   Recent Labs   Lab Test 02/25/25  1145 04/16/24  2205   BILITOTAL 0.3 0.2   ALKPHOS 101 91   ALBUMIN 4.3 4.1   AST 20 29   ALT 17 33          2/25/25 CXR:  IMPRESSION:   1.  Demonstration of focal dense consolidation and atelectatic changes  in the left upper lobe.  2.  No acute radiographic cardiopulmonary abnormality in this  examination.    7/3/2024 CXR:  IMPRESSION: Focal area of dense consolidation involving the left lung apex with left hilar retraction is unchanged. Linear scarring right lung apex unchanged. No new pulmonary infiltrates. Normal heart size and pulmonary vascularity. No overt osseous   abnormality.    4/16/2024 CXR:  IMPRESSION: No change. Heart  size normal. Severe scarring and volume loss involving left upper lobe with superior retraction of right hilum and prominent right apical pleural thickening. There is mild scarring and volume loss involving right upper lobe   which is stable. Lower lung zones clear, no new process.    4/30/2022 CT-Chest w/o contrast:  IMPRESSION: No change. Heart size normal. Severe scarring and volume loss involving left upper lobe with superior retraction of right hilum and prominent right apical pleural thickening. There is mild scarring and volume loss involving right upper lobe which is stable. Lower lung zones clear, no new process.

## 2025-03-03 NOTE — NURSING NOTE
Current patient location:  vehicle     Is the patient currently in the state of [place]? YES    Visit mode: VIDEO    If the visit is dropped, the patient can be reconnected by:VIDEO VISIT: Text to cell phone:   Telephone Information:   Mobile [phone]       Will anyone else be joining the visit? NO  (If patient encounters technical issues they should call [phone])    Are changes needed to the allergy or medication list?  Pt states having another allergy that is not on current list that causes cough but unsure what it is, unable to add     Are refills needed on medications prescribed by this physician? NA     Rooming Documentation:  Unable to complete questionnaire(s) due to time    Reason for visit: Consult    EDDIE WahlF

## 2025-03-03 NOTE — LETTER
3/3/2025       RE: Lexy Cadena  [address]  [facility] [address]     Dear Colleague,    Thank you for referring your patient, Lexy Cadena, to the [facility] [place] at [facility]. Please see a copy of my visit note below.    Virtual Visit Details    Type of service:  Video Visit   Video Start Time:  9:50AM (unable to start visit, as patient has been having technical issues)  Video End Time: 10:40AM    Originating Location (pt. Location): Other In car, [facility]    Distant Location (provider location):  On-site  Platform used for Video Visit: AmWell  ====================================================    [facility] [place]  [address] [facility] [address]  Phone: [phone]  Fax: [phone]    Patient:  Lexy Cadena, Date of birth 1985  Date of Visit:  03/03/2025  Referring Provider: Rober Maurer  Reason for visit: 'Mycobacterium (including Tuberculosis)'      Assessment & Plan   ID Problem List:  Cough, almost for ~10 years per patient  Left upper lobe focal consolidation, with atelectasis per radiographic findings likely sequela of previous pulmonary tuberculosis.   Hx of Mycobacterium avium s/p treatment with 3-drug regimen for about 6 months (2013)  Azithromycin, rifampin, ethambutol   Hx of active pulmonary tuberculosis s/p treatment with 6-months regimen (~2008, while in [place], [place] as a refugee camp).   Positive Quantiferon test  (5/27/2013, 3/17/2016)  Asthma on Dulera    Assessment:  39 yo [place]ib[place]ian-speaking male with PMHx of treated pulmonary tuberculosis, pulmonary NTM as details outlined here who is presented with long standing cough ~10 years, yellow colored, with occasional bloody streaks without any other systemic complaints. Agree with work up - sputum AFB stain/culture x3 specimens which have resulted negative for stain x3 and MTB PCR  probe. Though based on clinical history, low suspicion of active, new pulmonary tuberculosis. Limitation of this encounter, is that unable to examine due to virtual video visit. Suggest obtaining CT chest to assess if underlying etiology that explains cough. Also will refer to Pulmonology to weigh in.  ID follow up based on pending work up.     Recommendations:   Agree with sputum AFB stain/cultures x3 specimens.  So far negative AFB stain x3 including negative MTB PCR probe  Would add routine sputum culture with Gram stain.   CT chest w/o contrast  Pulmonology referral   Should coordinate with ambulatory Infection Prevention and Control regarding   ID follow up based on availability of above work up    Filipino-speaking phone  service is utilized throughout this encounter.     70 minutes spent by me on the date of the encounter doing chart review, history and exam, documentation and further activities per the note.      History of Present Illness    Pertinent history obtain from: chart review and patient  Mr. Cadena is referred to ID consult due to persistent cough. PMHx of pulmonary tuberculosis s/p treatment (~2008), pulmonary NTM s/p 6 months treatment (2013), asthma.     Patient reports cough for about 10 years now, with yellow colored phlegm, about 1 teaspoonful (at least 10-15 times per day), with bloody streaks when excessive coughing bouts. States that throat pain with excessive coughing which is bothersome. States that 'there are times I have fevers but not right now, and issue is cough', but could not provide further clarity and never measured temperature at home. Denies chills, night sweating, no change in appetite, or unintentional weight loss. Denies difficulty in breathing, chest pain or congestion. The only breathing related issues is underlying asthma-related which responds well to inhalers.     He believes that long standing issue of cough which is the main problem and seeking the  resolution.      Also recalls, hx of pulmonary Tuberculosis (while living as a refugee in [place], [place]) and treated for active Tb s/p 6-months treatment (unable to recall details). Per patient, was also receiving treatment while living in [place] and then relocated to [place] (when he was told to stop it).      SHx: (also per review of old notes in this EMR)  Born and raised in [place], the lived in [place], [place] (2003-Dec 2012).   [place] to [place] in [place]. [place], [place] x3 months, [place] and since lived here, except briefly lived in [place] x3 months. 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 , lives with wife and has 2 children (9 yo, 3 yo).     Specialty Problems          Infectious Diseases    TB (tuberculosis)            Physical Exam  General: on room air, able to speak in complete sentences, breathing appears comfortable.   Otherwise deferred, due to virtual video visit    Data  Laboratory data and imaging listed below was reviewed prior to this encounter.   Quantiferon - positive (5/27/2013, 3/17/2016)    Microbiology:    GS Culture   Date Value Ref Range Status   02/28/2025 See corresponding culture for results  Final   02/27/2025 See corresponding culture for results  Final     Hematology Studies:    Recent Labs   Lab Test 02/25/25  1145 04/16/24  2205 03/27/19  1240   WBC 3.6* 4.6  --    HGB 13.6 12.9*  --    MCV 86 81  --     178 154     Metabolic Studies:   Recent Labs   Lab Test 02/25/25  1145 04/16/24  2205    140   POTASSIUM 3.8 3.6   CHLORIDE 105 105   CO2 26 28   BUN 9.6 7.3   CR 0.66* 0.65*   GFRESTIMATED >90 >90     Hepatic Studies:   Recent Labs   Lab Test 02/25/25  1145 04/16/24  2205   BILITOTAL 0.3 0.2   ALKPHOS 101 91   ALBUMIN 4.3 4.1   AST 20 29   ALT 17 33          2/25/25 CXR:  IMPRESSION:   1.  Demonstration of focal dense consolidation and atelectatic changes  in the left upper lobe.  2.  No acute radiographic cardiopulmonary abnormality in  this  examination.    7/3/2024 CXR:  IMPRESSION: Focal area of dense consolidation involving the left lung apex with left hilar retraction is unchanged. Linear scarring right lung apex unchanged. No new pulmonary infiltrates. Normal heart size and pulmonary vascularity. No overt osseous   abnormality.    4/16/2024 CXR:  IMPRESSION: No change. Heart size normal. Severe scarring and volume loss involving left upper lobe with superior retraction of right hilum and prominent right apical pleural thickening. There is mild scarring and volume loss involving right upper lobe   which is stable. Lower lung zones clear, no new process.    4/30/2022 CT-Chest w/o contrast:  IMPRESSION: No change. Heart size normal. Severe scarring and volume loss involving left upper lobe with superior retraction of right hilum and prominent right apical pleural thickening. There is mild scarring and volume loss involving right upper lobe which is stable. Lower lung zones clear, no new process.           Again, thank you for allowing me to participate in the care of your patient.      Sincerely,    Jolanta Rodriguez MD

## 2025-03-04 LAB
BACTERIA SPEC CULT: NORMAL
GRAM STAIN RESULT: NORMAL

## 2025-03-06 LAB
ACID FAST STAIN (ARUP): NORMAL
ACID FAST STAIN (ARUP): NORMAL

## 2025-03-17 ENCOUNTER — TELEPHONE (OUTPATIENT)
Dept: FAMILY MEDICINE | Facility: CLINIC | Age: 40
End: 2025-03-17
Payer: COMMERCIAL

## 2025-03-17 ENCOUNTER — TELEPHONE (OUTPATIENT)
Dept: INFECTIOUS DISEASES | Facility: CLINIC | Age: 40
End: 2025-03-17
Payer: COMMERCIAL

## 2025-03-17 NOTE — TELEPHONE ENCOUNTER
DATE/TIME OF CALL RECEIVED FROM LAB:  03/17/25 at 10:19 AM   LAB TEST:  AFB Culture  LAB VALUE:  Positive  PROVIDER NOTIFIED?: Yes  PROVIDER NAME: Dr. Dozier  DATE/TIME LAB VALUE REPORTED TO PROVIDER: 03/17/2025 10:20 AM  MECHANISM OF PROVIDER NOTIFICATION: Face-To-Face  PROVIDER RESPONSE: Dr. Dozier said patient is already connected to Infectious disease and they should see patient and follow up.   Juana Rodriguez RN

## 2025-03-17 NOTE — TELEPHONE ENCOUNTER
Relayed results to SAMANTHA Cuevas and faxed results to their office as it is not in the system yet.   Jael Joaquin RN

## 2025-03-17 NOTE — TELEPHONE ENCOUNTER
Attempted to call lab at number left by Jael, no answer.  Call to [facility] directly. Let ring for 7 minutes. No answer.  Writer will attempt calling Jael at in an hour.     Faith Lamb RN

## 2025-03-17 NOTE — TELEPHONE ENCOUNTER
DATE/TIME OF CALL RECEIVED FROM LAB:  03/17/25 at 1:48 PM   LAB TEST:  AFB sputum culture  LAB VALUE:  positive (preliminary result)  PROVIDER NOTIFIED?: Yes  PROVIDER NAME: Jennifer  DATE/TIME LAB VALUE REPORTED TO PROVIDER: 1352  MECHANISM OF PROVIDER NOTIFICATION:  secure chat  PROVIDER RESPONSE: Jennifer received message. No orders at this time

## 2025-03-17 NOTE — TELEPHONE ENCOUNTER
Called ID to relay results. Nurse not available. Message was sent to the nurse requesting a call back in regards to AFB culture and AFB stain showing positive for Acid fast bacilli.   Jael Joaquin RN

## 2025-03-17 NOTE — TELEPHONE ENCOUNTER
[facility] Call Center    Phone Message    May a detailed message be left on voicemail: yes     Reason for Call: Other: .     Jael is wanting to get a call back in regards to reporting critical lab results. Please advise.       Action Taken: Message routed to:  [facility]): ID    Travel Screening: Not Applicable     Date of Service:

## 2025-03-18 ENCOUNTER — TELEPHONE (OUTPATIENT)
Dept: FAMILY MEDICINE | Facility: CLINIC | Age: 40
End: 2025-03-18
Payer: COMMERCIAL

## 2025-03-18 NOTE — TELEPHONE ENCOUNTER
----- Message from Renee Bentley sent at 3/13/2025  6:34 PM CDT -----  Please call patient with the following message and help them schedule follow up with me next available:     We are still waiting on the final results, but so far the lab testing for TB is reassuring. The infectious disease doctor you saw earlier this month is recommending additional imaging of your chest called a CT scan. It is important that you get this testing done as soon as possible. Please let us know if you have not been contacted to schedule this study.    -Normal red blood cell (hgb) levels, decreased white blood cell count and normal platelet levels. ADVISE: we will continue to monitor with a repeat lab in 3-6 months  -Liver and gallbladder tests are normal (ALT,AST, Alk phos, bilirubin), kidney function is normal (Cr, GFR), sodium is normal, potassium is normal, calcium is normal, glucose is normal.  Please follow-up with me in clinic to discuss these results.  For additional lab test information, [email or web address] is an excellent reference.    Thanks,  Renee Bentley MD

## 2025-03-18 NOTE — TELEPHONE ENCOUNTER
Lab test result for AFB culture and stain have since changed and multiple providers notified of the new culture results. Will mail normal results to patient per lab standard work. CT is scheduled for 3/26/25. Will follow up with provider to see if ok to have patient come in to clinic with most recent results. If so, PCS to call and schedule appointment.    Jael Joaquin RN

## 2025-03-20 LAB
ACID FAST STAIN (ARUP): ABNORMAL
ORGANISM (ARUP): ABNORMAL

## 2025-03-24 LAB
ACID FAST STAIN (ARUP): ABNORMAL
ORGANISM (ARUP): ABNORMAL
ORGANISM (ARUP): ABNORMAL

## 2025-03-24 NOTE — TELEPHONE ENCOUNTER
Reviewed chart, patient has CT scheduled for 3/26/25 and will see ID on 4/14/25.   Jael Joaquin RN

## 2025-04-24 DIAGNOSIS — R05.9 COUGH: Primary | ICD-10-CM

## 2025-04-29 LAB
ACID FAST STAIN (ARUP): NORMAL

## 2025-05-01 ENCOUNTER — TELEPHONE (OUTPATIENT)
Dept: FAMILY MEDICINE | Facility: CLINIC | Age: 40
End: 2025-05-01
Payer: COMMERCIAL

## 2025-05-01 NOTE — TELEPHONE ENCOUNTER
----- Message from Renee Bentley sent at 4/30/2025 10:51 AM CDT -----  [facility] team, could we follow up with this patient because he no showed at his visit with infectious disease?   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 Given no new pulmonary changes on recent CT chest, there is no indication for treatment per Dr. Rodriguez (Infectious Disease). Final result letter sent.  He is scheduled with pulmonology on 7/14/25. Please emphasize the importance of keeping that appointment with pulmonology.  Thank you,  Renee Bentley MD

## 2025-05-02 NOTE — TELEPHONE ENCOUNTER
Second attempt to reach patient with no answer. Results letter has already been sent to patient.  Jael Joaquin RN

## 2025-07-08 ENCOUNTER — PATIENT OUTREACH (OUTPATIENT)
Dept: CARE COORDINATION | Facility: CLINIC | Age: 40
End: 2025-07-08
Payer: COMMERCIAL

## 2025-07-14 ENCOUNTER — LAB (OUTPATIENT)
Dept: LAB | Facility: CLINIC | Age: 40
End: 2025-07-14
Payer: COMMERCIAL

## 2025-07-14 ENCOUNTER — OFFICE VISIT (OUTPATIENT)
Dept: PULMONOLOGY | Facility: CLINIC | Age: 40
End: 2025-07-14
Attending: STUDENT IN AN ORGANIZED HEALTH CARE EDUCATION/TRAINING PROGRAM
Payer: COMMERCIAL

## 2025-07-14 VITALS
BODY MASS INDEX: 20.03 KG/M2 | OXYGEN SATURATION: 99 % | WEIGHT: 127.6 LBS | HEART RATE: 66 BPM | DIASTOLIC BLOOD PRESSURE: 70 MMHG | HEIGHT: 67 IN | SYSTOLIC BLOOD PRESSURE: 105 MMHG

## 2025-07-14 DIAGNOSIS — J47.9 BRONCHIECTASIS WITHOUT COMPLICATION (H): Primary | ICD-10-CM

## 2025-07-14 DIAGNOSIS — J47.9 BRONCHIECTASIS WITHOUT COMPLICATION (H): ICD-10-CM

## 2025-07-14 DIAGNOSIS — R05.9 COUGH, UNSPECIFIED TYPE: ICD-10-CM

## 2025-07-14 DIAGNOSIS — R05.9 COUGH: ICD-10-CM

## 2025-07-14 DIAGNOSIS — Z86.11 HISTORY OF TB (TUBERCULOSIS): ICD-10-CM

## 2025-07-14 LAB
BASOPHILS # BLD AUTO: 0 10E3/UL (ref 0–0.2)
BASOPHILS NFR BLD AUTO: 1 %
DLCOCOR-%PRED-PRE: 62 %
DLCOCOR-PRE: 17.48 ML/MIN/MMHG
DLCOUNC-%PRED-PRE: 63 %
DLCOUNC-PRE: 17.75 ML/MIN/MMHG
DLCOUNC-PRED: 28.09 ML/MIN/MMHG
EOSINOPHIL # BLD AUTO: 0.2 10E3/UL (ref 0–0.7)
EOSINOPHIL NFR BLD AUTO: 4 %
ERV-%PRED-PRE: 75 %
ERV-PRE: 1.01 L
ERV-PRED: 1.34 L
ERYTHROCYTE [DISTWIDTH] IN BLOOD BY AUTOMATED COUNT: 13.5 % (ref 10–15)
EXPTIME-PRE: 11.01 SEC
FEF2575-%PRED-PRE: 20 %
FEF2575-PRE: 0.72 L/SEC
FEF2575-PRED: 3.5 L/SEC
FEFMAX-%PRED-PRE: 35 %
FEFMAX-PRE: 3.31 L/SEC
FEFMAX-PRED: 9.45 L/SEC
FEV1-%PRED-PRE: 43 %
FEV1-PRE: 1.53 L
FEV1FEV6-PRE: 66 %
FEV1FEV6-PRED: 81 %
FEV1FVC-PRE: 59 %
FEV1FVC-PRED: 82 %
FEV1SVC-PRE: 63 L
FEV1SVC-PRED: 70 L
FIFMAX-PRE: 1.91 L/SEC
FRCPLETH-%PRED-PRE: 89 %
FRCPLETH-PRE: 2.67 L
FRCPLETH-PRED: 2.97 L
FVC-%PRED-PRE: 60 %
FVC-PRE: 2.59 L
FVC-PRED: 4.3 L
GAW-PRED: 1.03 L/S/CMH2O
HCT VFR BLD AUTO: 43.6 % (ref 40–53)
HGB BLD-MCNC: 14.4 G/DL (ref 13.3–17.7)
IC-%PRED-PRE: 40 %
IC-PRE: 1.41 L
IC-PRED: 3.52 L
IMM GRANULOCYTES # BLD: 0 10E3/UL
IMM GRANULOCYTES NFR BLD: 0 %
LYMPHOCYTES # BLD AUTO: 1.6 10E3/UL (ref 0.8–5.3)
LYMPHOCYTES NFR BLD AUTO: 38 %
Lab: 72 %
MCH RBC QN AUTO: 28.1 PG (ref 26.5–33)
MCHC RBC AUTO-ENTMCNC: 33 G/DL (ref 31.5–36.5)
MCV RBC AUTO: 85 FL (ref 78–100)
MONOCYTES # BLD AUTO: 0.4 10E3/UL (ref 0–1.3)
MONOCYTES NFR BLD AUTO: 10 %
NEUTROPHILS # BLD AUTO: 2 10E3/UL (ref 1.6–8.3)
NEUTROPHILS NFR BLD AUTO: 48 %
NRBC # BLD AUTO: 0 10E3/UL
NRBC BLD AUTO-RTO: 0 /100
PLATELET # BLD AUTO: 168 10E3/UL (ref 150–450)
RBC # BLD AUTO: 5.12 10E6/UL (ref 4.4–5.9)
RVPLETH-%PRED-PRE: 110 %
RVPLETH-PRE: 1.66 L
RVPLETH-PRED: 1.5 L
SGAW-PRED: 0.2 1/CMH2O*S
SRAW-PRED: < 4.76 CMH2O*S
TLCPLETH-%PRED-PRE: 62 %
TLCPLETH-PRE: 4.08 L
TLCPLETH-PRED: 6.49 L
VA-%PRED-PRE: 54 %
VA-PRE: 3.27 L
VC-%PRED-PRE: 48 %
VC-PRE: 2.42 L
VC-PRED: 5 L
WBC # BLD AUTO: 4.2 10E3/UL (ref 4–11)

## 2025-07-14 PROCEDURE — 94375 RESPIRATORY FLOW VOLUME LOOP: CPT | Performed by: INTERNAL MEDICINE

## 2025-07-14 PROCEDURE — 3074F SYST BP LT 130 MM HG: CPT

## 2025-07-14 PROCEDURE — 94726 PLETHYSMOGRAPHY LUNG VOLUMES: CPT | Performed by: INTERNAL MEDICINE

## 2025-07-14 PROCEDURE — 86003 ALLG SPEC IGE CRUDE XTRC EA: CPT

## 2025-07-14 PROCEDURE — 95012 NITRIC OXIDE EXP GAS DETER: CPT | Performed by: INTERNAL MEDICINE

## 2025-07-14 PROCEDURE — 36415 COLL VENOUS BLD VENIPUNCTURE: CPT | Performed by: PATHOLOGY

## 2025-07-14 PROCEDURE — 99204 OFFICE O/P NEW MOD 45 MIN: CPT | Mod: 25

## 2025-07-14 PROCEDURE — 94150 VITAL CAPACITY TEST: CPT | Performed by: INTERNAL MEDICINE

## 2025-07-14 PROCEDURE — 99000 SPECIMEN HANDLING OFFICE-LAB: CPT | Performed by: PATHOLOGY

## 2025-07-14 PROCEDURE — 85025 COMPLETE CBC W/AUTO DIFF WBC: CPT | Performed by: PATHOLOGY

## 2025-07-14 PROCEDURE — 3078F DIAST BP <80 MM HG: CPT

## 2025-07-14 PROCEDURE — G0463 HOSPITAL OUTPT CLINIC VISIT: HCPCS

## 2025-07-14 PROCEDURE — 1126F AMNT PAIN NOTED NONE PRSNT: CPT

## 2025-07-14 PROCEDURE — 94729 DIFFUSING CAPACITY: CPT | Performed by: INTERNAL MEDICINE

## 2025-07-14 RX ORDER — GUAIFENESIN 600 MG/1
600 TABLET, EXTENDED RELEASE ORAL DAILY
Qty: 30 TABLET | Refills: 3 | Status: SHIPPED | OUTPATIENT
Start: 2025-07-14

## 2025-07-14 ASSESSMENT — PAIN SCALES - GENERAL: PAINLEVEL_OUTOF10: NO PAIN (0)

## 2025-07-14 NOTE — PROGRESS NOTES
[facility] [address] [address]  Phone: [phone]  Fax: [phone]    Patient:  Lexy Cadena, Date of birth 1985  Date of Visit:  07/14/2025  Referring Provider Referred Self      Assessment & Plan    Bronchiectasis secondary to previous tuberculosis  COPD likely secondary to previous tuberculosis   History of asthma  History of JORGE   Recent AFB culture growing M. abscessus   Mr. Cadena is a 40 year old male with a history of active TB (treated 2008), JORGE, recent cultures positive for M. abscessus, and history of asthma, who presents today for evaluation of a chronic cough.    His cough has been worsening over the last several months despite use of montelukast and Advair. His cough is sometimes productive of yellow sputum and at other times is clear. He has a history of hemoptysis but has been more than 2 months since this last occurred. In review of his studies, his PFTs demonstrate moderate obstruction and restriction, although not significantly changed from most recent PFTs (2016). Additionally, his CT scan shows evidence of previous TB infection in the left upper lobe with significant bronchiectasis.     Overall, suspect his chronic cough is due to bronchiectasis and possible COPD, both secondary to his previous tuberculosis infection. Unclear the relevance of his positive culture showing M abscessus in 3/2025, but per ID did not require treatment.     For his cough, recommend improving airway clearance. We can continue his advair for now, but will add on flutter valve. Provided instructions for use as well as proper inhaler technique in clinic today. Will also obtain labs to assess for component of allergies and atopy. If negative, we can consider switching his inhaler from ICS/LABA (advair) to LABA/LAMA like Anoro.     Plan:  - Labs today: CBC with differential, respiratory allergen panel   - Continue Advair  inhaler, 2 puffs BID, for now    - pending results of lab studies can switch to Anoro   - Start Aerobika, 10 breaths twice per day after using inhaler   - Continue montelukast 10 mg at bedtime  - Start mucinex 600 mg daily   - RTC in 6 months or sooner if needed.    Patient was seen and discussed with Dr. Vargas.     Kenyatta Bruno MD, PhD  PGY-2 Internal Medicine Resident  [facility]     Physician Attestation   I, Andres Vargas MD, saw this patient and agree with the findings and plan of care as documented in the note.      Items personally reviewed/procedural attestation: vitals, labs, imaging and agree with the interpretation documented in the note, and spirometry report and agree with the interpretation documented in the note.    Andres Vargas MD     Medical Decision Making                        Today's visit note:     Chief Complaint: New Patient (New Pulmonary )      HISTORY OF PRESENT ILLNESS:    Lexy Cadena is a 40 year old year old male with a history of asthma, pulmonary tuberculosis (treated), and pulmonary non-TB mycobacterium infection who is being seen for chronic cough.    He has a history of active TB that was treated for 6 months (regimen unknown) ~ 2008 when he was a refugee (from [place]) in [place]. He was again treated for TB ~2013 when he immigrated to [place] but this treatment was discontinued when he moved to [place] about 3 months later. Later diagnosed with JORGE after hospitalized for cough and back pain. He had a positive quantiferon gold in 2013 and 2016. He was last seen at [facility] pulmonology clinic in 3/2016 by Dr. Tomas. When seen, he reported a baseline cough productive of white sputum. PFTs showed primary restrictive with mixed obstructive disease, although ATS criteria not met.     He has not followed consistently with a primary care provider. He recently established care with Dr. Bentley in 2/2025. At that time, he complained of a persistent, productive  "cough sometimes associated with hemoptysis. He also endorsed intermittent fever and sweats. Because of this and his history, he was referred to the ID clinic. 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 He additionally had a chest XR and CT that showed a known BRENDA consolidation with atelectasis consistent with previous pulmonary TB. Because there were no radiographic changes, no treatment for the M. abscessus was warranted per ID.     Today's visit (7/14/2025) was completed with presence of a [facility] :   He says he has been having \"symptoms of asthma and bronchitis\" -- specifically a cough -- that have been getting worse over the last few months. Cough happens day and night, will wake him up at night. Cough is productive of either a yellow or white sputum. \"When I get a cold or the flu, the cough comes with blood\". Last time when he had blood in sputum was 2 months ago. Has not had a cold since then. This morning his sputum was light yellow-clear. He additionally endorses a feeling of wheezing in the chest through the throat. This happens both with activity and rest. Wheezing and cough significantly worsen if his AC turns off. He cannot run without getting short of breath. Breathing is the same whether he is sitting or laying.    For these symptoms, he has tried using an inhaler. See Dulera on his med list but after reviewing different inhalers, he says he is using fluticasone/salmeterol twice per day. Seems to help a little.     ROS otherwise negative for fever, chills, night sweats, weight loss, GERD, diarrhea, constipation, pain with urination     Other social history  - Denies smoking, tried 25-26 years ago   - no other tobacco use   - no marijuana or vaping  - he drives for a living   - He lives in an apartment in [place]   - No pets          Past Medical and Surgical History:     Past Medical History:   Diagnosis Date    " Arthritis     TB (pulmonary tuberculosis)     HISTORY, HAS BEEN TREATED    Uncomplicated asthma      No past surgical history on file.        Family History:     Family History   Problem Relation Age of Onset    Hypertension Mother     Hypertension Brother     Diabetes Brother               Social History:     Social History     Socioeconomic History    Marital status: Single     Spouse name: Not on file    Number of children: Not on file    Years of education: Not on file    Highest education level: Not on file   Occupational History     Comment: Works at [employer]   Tobacco Use    Smoking status: Former     Types: Cigars, cigarillos or filtered cigars    Smokeless tobacco: Never    Tobacco comments:     20 years ago smoked cigars for a couple of months   Vaping Use    Vaping status: Never Used   Substance and Sexual Activity    Alcohol use: No     Alcohol/week: 0.0 standard drinks of alcohol    Drug use: No    Sexual activity: Yes     Partners: Female     Birth control/protection: None   Other Topics Concern    Parent/sibling w/ CABG, MI or angioplasty before 65F 55M? Not Asked   Social History Narrative    ** Merged History Encounter **          Social Drivers of Health     Financial Resource Strain: Low Risk  (2/25/2025)    Financial Resource Strain     Within the past 12 months, have you or your family members you live with been unable to get utilities (heat, electricity) when it was really needed?: No   Food Insecurity: Low Risk  (2/25/2025)    Food Insecurity     Within the past 12 months, did you worry that your food would run out before you got money to buy more?: No     Within the past 12 months, did the food you bought just not last and you didn t have money to get more?: No   Transportation Needs: Low Risk  (2/25/2025)    Transportation Needs     Within the past 12 months, has lack of transportation kept you from medical appointments, getting your medicines, non-medical meetings or appointments,  "work, or from getting things that you need?: No   Physical Activity: Not on File (7/27/2021)    Received from [facility]    Physical Activity     Physical Activity: 0   Stress: Not on File (7/27/2021)    Received from [facility]    Stress     Stress: 0   Social Connections: Not on File (9/18/2024)    Received from [facility]    Social Connections     Connectedness: 0   Interpersonal Safety: Low Risk  (2/25/2025)    Interpersonal Safety     Do you feel physically and emotionally safe where you currently live?: Yes     Within the past 12 months, have you been hit, slapped, kicked or otherwise physically hurt by someone?: No     Within the past 12 months, have you been humiliated or emotionally abused in other ways by your partner or ex-partner?: No   Housing Stability: Low Risk  (2/25/2025)    Housing Stability     Do you have housing? : Yes     Are you worried about losing your housing?: No            Medications:     Current Outpatient Medications   Medication Sig Dispense Refill    acetaminophen (TYLENOL) 500 MG tablet Take 500-1,000 mg by mouth every 6 hours as needed for mild pain or fever.      guaiFENesin (MUCINEX) 600 MG 12 hr tablet Take 1 tablet (600 mg) by mouth daily. 30 tablet 3    diclofenac (FLECTOR) 1.3 % patch Externally apply 1 patch topically 2 times daily. (Patient not taking: Reported on 7/14/2025) 30 patch 0    gabapentin (NEURONTIN) 100 MG capsule Take 1 capsule (100 mg) by mouth 3 times daily for 14 days. (Patient not taking: Reported on 7/14/2025) 42 capsule 0    montelukast (SINGULAIR) 10 MG tablet Take 1 tablet by mouth at bedtime.       No current facility-administered medications for this visit.            Review of Systems:       A complete review of systems was otherwise negative except as noted in the HPI.      PHYSICAL EXAM:  /70 (BP Location: Left arm, Patient Position: Chair, Cuff Size: Adult Regular)   Pulse 66   Ht 1.702 m (5' 7\")   Wt 57.9 kg (127 lb 9.6 oz)   SpO2 99%   BMI 19.98 " kg/m       General: Comfortable, No apparent distress  Eyes: Anicteric  Nose: Nasal mucosa with no edema or hyperemia.  No polyps  Ears: Hearing grossly normal  Mouth: Oral mucosa is moist. Poor dentition.  Neck: no thyromegaly  Respiratory: Good air movement. No crackles. No rhonchi. No wheezes  Cardiac: RRR, normal S1, S2. No murmurs. No JVD  Abdomen: Soft, NT/ND  Musculoskeletal: Extremities normal. No clubbing. No cyanosis. No edema.  Skin: No rash on limited exam  Neuro: Normal mentation. Normal speech.  Psych: Normal affect           Data:   All laboratory and imaging data reviewed.      PFT:   2025  FEV1/FVC ratio 59.   FEV1 1.53 (43% predicted).   FVC 2.59 (60% predicted).   DLCO 17.75  T.08 (62% predicted).   RV: 1.66 (110% predicted)        PFT Interpretation:  Moderate restrictive and obstructive disease.   Variable effort, bronchodilator deferred.   Unable to inhale > 90% vital capacity for DLCO testing.     CXR: I have reviewed the CXR images from 2025 and agree with the radiologist interpretation below:   IMPRESSION:   1.  Demonstration of focal dense consolidation and atelectatic changes  in the left upper lobe.  2.  No acute radiographic cardiopulmonary abnormality in this  examination.    Chest CT: I have reviewed the chest CT images from 3/26/2025 and agree with the radiologist interpretation below:  FINDINGS: Included thyroid appears normal. Fibrocavitary changes in  the left apex as on the recent radiograph and appear similar to  previous. Although bronchiectasis in the right upper lobe with areas  of bronchial wall thickening also unchanged. Pleural-based  triangular-shaped nodule lateral left lower lobe measures up to 5 mm  unchanged consistent with scar. Other focal pleural-based scar also  unchanged in the lateral left lower lobe measuring 3 mm. No new  nodules or areas of abnormal opacity. Unchanged superior segment right  lower lobe pleural-based triangle shaped nodule also  likely indicating  scar.  No effusions. Heart size normal. Aorta and pulmonary artery grossly  normal size. No enlarged lymph nodes. Degenerative changes in the  spine. No paraspinal mass. The included upper abdomen appears grossly  unremarkable.                                                          IMPRESSION: No significant changes from CT scan April 2022 with  chronic probable mycobacterial infection. Unchanged pleural based  scars.    Recent Results (from the past week)   Pulmonary function test    Collection Time: 07/14/25 12:44 PM   Result Value Ref Range    FVC-Pred 4.30 L    FEV1SVC-Pred 70 L    FEV1FEV6-Pred 81 %    FEV1FVC-Pred 82 %    FEFMax-Pred 9.45 L/sec    FEF2575-Pred 3.50 L/sec    FVC-Pre 2.59 L    FVC-%Pred-Pre 60 %    FEV1SVC-Pre 63 L    FEV1-Pre 1.53 L    FEV1-%Pred-Pre 43 %    FEV1FEV6-Pre 66 %    FEV1FVC-Pre 59 %    ZIV3GHV-%Pred-Pre 72 %    FEFMax-Pre 3.31 L/sec    FEFMax-%Pred-Pre 35 %    FEF2575-Pre 0.72 L/sec    WRL0816-%Pred-Pre 20 %    FIFMax-Pre 1.91 L/sec    ExpTime-Pre 11.01 sec    VC-Pred 5.00 L    IC-Pred 3.52 L    ERV-Pred 1.34 L    VC-Pre 2.42 L    VC-%Pred-Pre 48 %    IC-Pre 1.41 L    IC-%Pred-Pre 40 %    ERV-Pre 1.01 L    ERV-%Pred-Pre 75 %    FRCPleth-Pred 2.97 L    RVPleth-Pred 1.50 L    TLCPleth-Pred 6.49 L    Gaw-Pred 1.03 L/s/cmH2O    sRaw-Pred < 4.76 cmH2O*s    sGaw-Pred 0.20 1/cmH2O*s    FRCPleth-Pre 2.67 L    FRCPleth-%Pred-Pre 89 %    RVPleth-Pre 1.66 L    RVPleth-%Pred-Pre 110 %    TLCPleth-Pre 4.08 L    TLCPleth-%Pred-Pre 62 %    DLCOunc-Pred 28.09 ml/min/mmHg    DLCOunc-Pre 17.75 ml/min/mmHg    DLCOunc-%Pred-Pre 63 %    DLCOcor-Pre 17.48 ml/min/mmHg    DLCOcor-%Pred-Pre 62 %    VA-Pre 3.27 L    VA-%Pred-Pre 54 %

## 2025-07-14 NOTE — NURSING NOTE
Chief Complaint   Patient presents with    New Patient     New Pulmonary        Vitals were taken, medications reconciled.    Joel Mckeon, Clinic Assistant   1:45 PM

## 2025-07-14 NOTE — LETTER
7/14/2025      Lexy Cadena  [address]  [facility] [address]      Dear Colleague,    Thank you for referring your patient, Lexy Cadena, to the [facility]. Please see a copy of my visit note below.      [facility]  [address] [address]  Phone: [phone]  Fax: [phone]    Patient:  Lexy Cadena, Date of birth 1985  Date of Visit:  07/14/2025  Referring Provider Referred Self      Assessment & Plan   Bronchiectasis secondary to previous tuberculosis  COPD likely secondary to previous tuberculosis   History of asthma  History of JORGE   Recent AFB culture growing M. abscessus   Mr. Cadena is a 40 year old male with a history of active TB (treated 2008), JORGE, recent cultures positive for M. abscessus, and history of asthma, who presents today for evaluation of a chronic cough.    His cough has been worsening over the last several months despite use of montelukast and Advair. His cough is sometimes productive of yellow sputum and at other times is clear. He has a history of hemoptysis but has been more than 2 months since this last occurred. In review of his studies, his PFTs demonstrate moderate obstruction and restriction, although not significantly changed from most recent PFTs (2016). Additionally, his CT scan shows evidence of previous TB infection in the left upper lobe with significant bronchiectasis.     Overall, suspect his chronic cough is due to bronchiectasis and possible COPD, both secondary to his previous tuberculosis infection. Unclear the relevance of his positive culture showing M abscessus in 3/2025, but per ID did not require treatment.     For his cough, recommend improving airway clearance. We can continue his advair for now, but will add on flutter valve. Provided instructions for use as well as proper inhaler technique in  clinic today. Will also obtain labs to assess for component of allergies and atopy. If negative, we can consider switching his inhaler from ICS/LABA (advair) to LABA/LAMA like Anoro.     Plan:  - Labs today: CBC with differential, respiratory allergen panel   - Continue Advair inhaler, 2 puffs BID, for now    - pending results of lab studies can switch to Anoro   - Start Aerobika, 10 breaths twice per day after using inhaler   - Continue montelukast 10 mg at bedtime  - Start mucinex 600 mg daily   - RTC in 6 months or sooner if needed.    Patient was seen and discussed with Dr. Vargas.     Kenyatta Bruno MD, PhD  PGY-2 Internal Medicine Resident  [facility]     Physician Attestation  I, Andres Vargas MD, saw this patient and agree with the findings and plan of care as documented in the note.      Items personally reviewed/procedural attestation: vitals, labs, imaging and agree with the interpretation documented in the note, and spirometry report and agree with the interpretation documented in the note.    Andres Vargas MD     Medical Decision Making                       Today's visit note:     Chief Complaint: New Patient (New Pulmonary )      HISTORY OF PRESENT ILLNESS:    Lexy Cadena is a 40 year old year old male with a history of asthma, pulmonary tuberculosis (treated), and pulmonary non-TB mycobacterium infection who is being seen for chronic cough.    He has a history of active TB that was treated for 6 months (regimen unknown) ~ 2008 when he was a refugee (from [place]) in [place]. He was again treated for TB ~2013 when he immigrated to [place] but this treatment was discontinued when he moved to [place] about 3 months later. Later diagnosed with JORGE after hospitalized for cough and back pain. He had a positive quantiferon gold in 2013 and 2016. He was last seen at [facility] pulmonology clinic in 3/2016 by Dr. Tomas. When seen, he reported a baseline cough productive of white sputum.  "PFTs showed primary restrictive with mixed obstructive disease, although ATS criteria not met.     He has not followed consistently with a primary care provider. He recently established care with Dr. Bentley in 2/2025. At that time, he complained of a persistent, productive cough sometimes associated with hemoptysis. He also endorsed intermittent fever and sweats. Because of this and his history, he was referred to the ID clinic. 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 He additionally had a chest XR and CT that showed a known BRENDA consolidation with atelectasis consistent with previous pulmonary TB. Because there were no radiographic changes, no treatment for the M. abscessus was warranted per ID.     Today's visit (7/14/2025) was completed with presence of a [facility] :   He says he has been having \"symptoms of asthma and bronchitis\" -- specifically a cough -- that have been getting worse over the last few months. Cough happens day and night, will wake him up at night. Cough is productive of either a yellow or white sputum. \"When I get a cold or the flu, the cough comes with blood\". Last time when he had blood in sputum was 2 months ago. Has not had a cold since then. This morning his sputum was light yellow-clear. He additionally endorses a feeling of wheezing in the chest through the throat. This happens both with activity and rest. Wheezing and cough significantly worsen if his AC turns off. He cannot run without getting short of breath. Breathing is the same whether he is sitting or laying.    For these symptoms, he has tried using an inhaler. See Dulera on his med list but after reviewing different inhalers, he says he is using fluticasone/salmeterol twice per day. Seems to help a little.     ROS otherwise negative for fever, chills, night sweats, weight loss, GERD, diarrhea, constipation, pain with urination     Other " social history  - Denies smoking, tried 25-26 years ago   - no other tobacco use   - no marijuana or vaping  - he drives for a living   - He lives in an apartment in [place]   - No pets          Past Medical and Surgical History:     Past Medical History:   Diagnosis Date     Arthritis      TB (pulmonary tuberculosis)     HISTORY, HAS BEEN TREATED     Uncomplicated asthma      No past surgical history on file.        Family History:     Family History   Problem Relation Age of Onset     Hypertension Mother      Hypertension Brother      Diabetes Brother               Social History:     Social History     Socioeconomic History     Marital status: Single     Spouse name: Not on file     Number of children: Not on file     Years of education: Not on file     Highest education level: Not on file   Occupational History     Comment: Works at [employer]   Tobacco Use     Smoking status: Former     Types: Cigars, cigarillos or filtered cigars     Smokeless tobacco: Never     Tobacco comments:     20 years ago smoked cigars for a couple of months   Vaping Use     Vaping status: Never Used   Substance and Sexual Activity     Alcohol use: No     Alcohol/week: 0.0 standard drinks of alcohol     Drug use: No     Sexual activity: Yes     Partners: Female     Birth control/protection: None   Other Topics Concern     Parent/sibling w/ CABG, MI or angioplasty before 65F 55M? Not Asked   Social History Narrative    ** Merged History Encounter **          Social Drivers of Health     Financial Resource Strain: Low Risk  (2/25/2025)    Financial Resource Strain      Within the past 12 months, have you or your family members you live with been unable to get utilities (heat, electricity) when it was really needed?: No   Food Insecurity: Low Risk  (2/25/2025)    Food Insecurity      Within the past 12 months, did you worry that your food would run out before you got money to buy more?: No      Within the past 12 months, did the  food you bought just not last and you didn t have money to get more?: No   Transportation Needs: Low Risk  (2/25/2025)    Transportation Needs      Within the past 12 months, has lack of transportation kept you from medical appointments, getting your medicines, non-medical meetings or appointments, work, or from getting things that you need?: No   Physical Activity: Not on File (7/27/2021)    Received from [employer]    Physical Activity      Physical Activity: 0   Stress: Not on File (7/27/2021)    Received from [employer]    Stress      Stress: 0   Social Connections: Not on File (9/18/2024)    Received from [employer] [employer]    Social Connections      Connectedness: 0   Interpersonal Safety: Low Risk  (2/25/2025)    Interpersonal Safety      Do you feel physically and emotionally safe where you currently live?: Yes      Within the past 12 months, have you been hit, slapped, kicked or otherwise physically hurt by someone?: No      Within the past 12 months, have you been humiliated or emotionally abused in other ways by your partner or ex-partner?: No   Housing Stability: Low Risk  (2/25/2025)    Housing Stability      Do you have housing? : Yes      Are you worried about losing your housing?: No            Medications:     Current Outpatient Medications   Medication Sig Dispense Refill     acetaminophen (TYLENOL) 500 MG tablet Take 500-1,000 mg by mouth every 6 hours as needed for mild pain or fever.       guaiFENesin (MUCINEX) 600 MG 12 hr tablet Take 1 tablet (600 mg) by mouth daily. 30 tablet 3     diclofenac (FLECTOR) 1.3 % patch Externally apply 1 patch topically 2 times daily. (Patient not taking: Reported on 7/14/2025) 30 patch 0     gabapentin (NEURONTIN) 100 MG capsule Take 1 capsule (100 mg) by mouth 3 times daily for 14 days. (Patient not taking: Reported on 7/14/2025) 42 capsule 0     montelukast (SINGULAIR) 10 MG tablet Take 1 tablet by mouth at bedtime.       No current facility-administered medications for this visit.  "           Review of Systems:       A complete review of systems was otherwise negative except as noted in the HPI.      PHYSICAL EXAM:  /70 (BP Location: Left arm, Patient Position: Chair, Cuff Size: Adult Regular)   Pulse 66   Ht 1.702 m (5' 7\")   Wt 57.9 kg (127 lb 9.6 oz)   SpO2 99%   BMI 19.98 kg/m       General: Comfortable, No apparent distress  Eyes: Anicteric  Nose: Nasal mucosa with no edema or hyperemia.  No polyps  Ears: Hearing grossly normal  Mouth: Oral mucosa is moist. Poor dentition.  Neck: no thyromegaly  Respiratory: Good air movement. No crackles. No rhonchi. No wheezes  Cardiac: RRR, normal S1, S2. No murmurs. No JVD  Abdomen: Soft, NT/ND  Musculoskeletal: Extremities normal. No clubbing. No cyanosis. No edema.  Skin: No rash on limited exam  Neuro: Normal mentation. Normal speech.  Psych: Normal affect           Data:   All laboratory and imaging data reviewed.      PFT:   2025  FEV1/FVC ratio 59.   FEV1 1.53 (43% predicted).   FVC 2.59 (60% predicted).   DLCO 17.75  T.08 (62% predicted).   RV: 1.66 (110% predicted)        PFT Interpretation:  Moderate restrictive and obstructive disease.   Variable effort, bronchodilator deferred.   Unable to inhale > 90% vital capacity for DLCO testing.     CXR: I have reviewed the CXR images from 2025 and agree with the radiologist interpretation below:   IMPRESSION:   1.  Demonstration of focal dense consolidation and atelectatic changes  in the left upper lobe.  2.  No acute radiographic cardiopulmonary abnormality in this  examination.    Chest CT: I have reviewed the chest CT images from 3/26/2025 and agree with the radiologist interpretation below:  FINDINGS: Included thyroid appears normal. Fibrocavitary changes in  the left apex as on the recent radiograph and appear similar to  previous. Although bronchiectasis in the right upper lobe with areas  of bronchial wall thickening also unchanged. Pleural-based  triangular-shaped " nodule lateral left lower lobe measures up to 5 mm  unchanged consistent with scar. Other focal pleural-based scar also  unchanged in the lateral left lower lobe measuring 3 mm. No new  nodules or areas of abnormal opacity. Unchanged superior segment right  lower lobe pleural-based triangle shaped nodule also likely indicating  scar.  No effusions. Heart size normal. Aorta and pulmonary artery grossly  normal size. No enlarged lymph nodes. Degenerative changes in the  spine. No paraspinal mass. The included upper abdomen appears grossly  unremarkable.                                                          IMPRESSION: No significant changes from CT scan April 2022 with  chronic probable mycobacterial infection. Unchanged pleural based  scars.    Recent Results (from the past week)   Pulmonary function test    Collection Time: 07/14/25 12:44 PM   Result Value Ref Range    FVC-Pred 4.30 L    FEV1SVC-Pred 70 L    FEV1FEV6-Pred 81 %    FEV1FVC-Pred 82 %    FEFMax-Pred 9.45 L/sec    FEF2575-Pred 3.50 L/sec    FVC-Pre 2.59 L    FVC-%Pred-Pre 60 %    FEV1SVC-Pre 63 L    FEV1-Pre 1.53 L    FEV1-%Pred-Pre 43 %    FEV1FEV6-Pre 66 %    FEV1FVC-Pre 59 %    ZST1PTV-%Pred-Pre 72 %    FEFMax-Pre 3.31 L/sec    FEFMax-%Pred-Pre 35 %    FEF2575-Pre 0.72 L/sec    TFT7301-%Pred-Pre 20 %    FIFMax-Pre 1.91 L/sec    ExpTime-Pre 11.01 sec    VC-Pred 5.00 L    IC-Pred 3.52 L    ERV-Pred 1.34 L    VC-Pre 2.42 L    VC-%Pred-Pre 48 %    IC-Pre 1.41 L    IC-%Pred-Pre 40 %    ERV-Pre 1.01 L    ERV-%Pred-Pre 75 %    FRCPleth-Pred 2.97 L    RVPleth-Pred 1.50 L    TLCPleth-Pred 6.49 L    Gaw-Pred 1.03 L/s/cmH2O    sRaw-Pred < 4.76 cmH2O*s    sGaw-Pred 0.20 1/cmH2O*s    FRCPleth-Pre 2.67 L    FRCPleth-%Pred-Pre 89 %    RVPleth-Pre 1.66 L    RVPleth-%Pred-Pre 110 %    TLCPleth-Pre 4.08 L    TLCPleth-%Pred-Pre 62 %    DLCOunc-Pred 28.09 ml/min/mmHg    DLCOunc-Pre 17.75 ml/min/mmHg    DLCOunc-%Pred-Pre 63 %    DLCOcor-Pre 17.48 ml/min/mmHg     DLCOcor-%Pred-Pre 62 %    VA-Pre 3.27 L    VA-%Pred-Pre 54 %     Again, thank you for allowing me to participate in the care of your patient.        Sincerely,        Andres Vargas MD    Electronically signed

## 2025-07-16 LAB

## 2025-07-22 ENCOUNTER — PATIENT OUTREACH (OUTPATIENT)
Dept: CARE COORDINATION | Facility: CLINIC | Age: 40
End: 2025-07-22
Payer: COMMERCIAL

## 2025-08-18 ENCOUNTER — OFFICE VISIT (OUTPATIENT)
Dept: FAMILY MEDICINE | Facility: CLINIC | Age: 40
End: 2025-08-18
Payer: COMMERCIAL

## 2025-08-18 VITALS
TEMPERATURE: 98.1 F | SYSTOLIC BLOOD PRESSURE: 118 MMHG | WEIGHT: 132.2 LBS | OXYGEN SATURATION: 98 % | HEART RATE: 68 BPM | BODY MASS INDEX: 20.75 KG/M2 | DIASTOLIC BLOOD PRESSURE: 76 MMHG | HEIGHT: 67 IN | RESPIRATION RATE: 18 BRPM

## 2025-08-18 DIAGNOSIS — Z86.11 HISTORY OF TB (TUBERCULOSIS): ICD-10-CM

## 2025-08-18 DIAGNOSIS — J20.9 ACUTE BRONCHITIS, UNSPECIFIED ORGANISM: Primary | ICD-10-CM

## 2025-08-18 DIAGNOSIS — R05.2 SUBACUTE COUGH: ICD-10-CM

## 2025-08-18 DIAGNOSIS — J02.9 ACUTE VIRAL PHARYNGITIS: ICD-10-CM

## 2025-08-18 DIAGNOSIS — J44.9 CHRONIC OBSTRUCTIVE PULMONARY DISEASE, UNSPECIFIED COPD TYPE (H): ICD-10-CM

## 2025-08-18 DIAGNOSIS — Z23 IMMUNIZATION DUE: ICD-10-CM

## 2025-08-18 DIAGNOSIS — J47.9 BRONCHIECTASIS WITHOUT COMPLICATION (H): ICD-10-CM

## 2025-08-18 PROCEDURE — 99214 OFFICE O/P EST MOD 30 MIN: CPT | Mod: 25 | Performed by: STUDENT IN AN ORGANIZED HEALTH CARE EDUCATION/TRAINING PROGRAM

## 2025-08-18 PROCEDURE — 3078F DIAST BP <80 MM HG: CPT | Performed by: STUDENT IN AN ORGANIZED HEALTH CARE EDUCATION/TRAINING PROGRAM

## 2025-08-18 PROCEDURE — 3074F SYST BP LT 130 MM HG: CPT | Performed by: STUDENT IN AN ORGANIZED HEALTH CARE EDUCATION/TRAINING PROGRAM

## 2025-08-18 PROCEDURE — 90715 TDAP VACCINE 7 YRS/> IM: CPT | Performed by: STUDENT IN AN ORGANIZED HEALTH CARE EDUCATION/TRAINING PROGRAM

## 2025-08-18 PROCEDURE — 90471 IMMUNIZATION ADMIN: CPT | Performed by: STUDENT IN AN ORGANIZED HEALTH CARE EDUCATION/TRAINING PROGRAM

## 2025-08-18 RX ORDER — UMECLIDINIUM BROMIDE AND VILANTEROL TRIFENATATE 62.5; 25 UG/1; UG/1
1 POWDER RESPIRATORY (INHALATION) DAILY
Qty: 60 EACH | Refills: 11 | Status: SHIPPED | OUTPATIENT
Start: 2025-08-18

## 2025-08-18 RX ORDER — GUAIFENESIN 600 MG/1
600 TABLET, EXTENDED RELEASE ORAL DAILY
Qty: 30 TABLET | Refills: 11 | Status: SHIPPED | OUTPATIENT
Start: 2025-08-18

## 2025-08-18 RX ORDER — MONTELUKAST SODIUM 10 MG/1
1 TABLET ORAL AT BEDTIME
Qty: 30 TABLET | Refills: 11 | Status: SHIPPED | OUTPATIENT
Start: 2025-08-18

## (undated) RX ORDER — METHYLPREDNISOLONE ACETATE 80 MG/ML
INJECTION, SUSPENSION INTRA-ARTICULAR; INTRALESIONAL; INTRAMUSCULAR; SOFT TISSUE
Status: DISPENSED
Start: 2019-03-27

## (undated) RX ORDER — BUPIVACAINE HYDROCHLORIDE 5 MG/ML
INJECTION, SOLUTION EPIDURAL; INTRACAUDAL
Status: DISPENSED
Start: 2019-03-27

## (undated) RX ORDER — LIDOCAINE HYDROCHLORIDE 10 MG/ML
INJECTION, SOLUTION EPIDURAL; INFILTRATION; INTRACAUDAL; PERINEURAL
Status: DISPENSED
Start: 2019-03-27